# Patient Record
Sex: MALE | Race: WHITE | NOT HISPANIC OR LATINO | Employment: FULL TIME | URBAN - NONMETROPOLITAN AREA
[De-identification: names, ages, dates, MRNs, and addresses within clinical notes are randomized per-mention and may not be internally consistent; named-entity substitution may affect disease eponyms.]

---

## 2019-09-05 ENCOUNTER — OFFICE VISIT (OUTPATIENT)
Dept: FAMILY MEDICINE | Facility: OTHER | Age: 19
End: 2019-09-05
Attending: PHYSICIAN ASSISTANT
Payer: MEDICAID

## 2019-09-05 VITALS
HEART RATE: 80 BPM | SYSTOLIC BLOOD PRESSURE: 110 MMHG | RESPIRATION RATE: 18 BRPM | DIASTOLIC BLOOD PRESSURE: 82 MMHG | TEMPERATURE: 97.4 F | WEIGHT: 128 LBS

## 2019-09-05 DIAGNOSIS — R42 DIZZINESS: Primary | ICD-10-CM

## 2019-09-05 DIAGNOSIS — E16.2 HYPOGLYCEMIA: ICD-10-CM

## 2019-09-05 LAB
ANION GAP SERPL CALCULATED.3IONS-SCNC: 7 MMOL/L (ref 3–14)
BASOPHILS # BLD AUTO: 0.1 10E9/L (ref 0–0.2)
BASOPHILS NFR BLD AUTO: 0.9 %
BUN SERPL-MCNC: 12 MG/DL (ref 7–25)
CALCIUM SERPL-MCNC: 9.6 MG/DL (ref 8.6–10.3)
CHLORIDE SERPL-SCNC: 101 MMOL/L (ref 98–107)
CO2 SERPL-SCNC: 31 MMOL/L (ref 21–31)
CREAT SERPL-MCNC: 1.13 MG/DL (ref 0.7–1.3)
DIFFERENTIAL METHOD BLD: NORMAL
EOSINOPHIL # BLD AUTO: 0.2 10E9/L (ref 0–0.7)
EOSINOPHIL NFR BLD AUTO: 2.3 %
ERYTHROCYTE [DISTWIDTH] IN BLOOD BY AUTOMATED COUNT: 11.8 % (ref 10–15)
GFR SERPL CREATININE-BSD FRML MDRD: NORMAL ML/MIN/{1.73_M2}
GLUCOSE SERPL-MCNC: 96 MG/DL (ref 70–105)
HBA1C MFR BLD: 5 % (ref 4–6)
HCT VFR BLD AUTO: 47.9 % (ref 40–53)
HGB BLD-MCNC: 16.2 G/DL (ref 13.3–17.7)
IMM GRANULOCYTES # BLD: 0 10E9/L (ref 0–0.4)
IMM GRANULOCYTES NFR BLD: 0.4 %
LYMPHOCYTES # BLD AUTO: 2.2 10E9/L (ref 0.8–5.3)
LYMPHOCYTES NFR BLD AUTO: 27.2 %
MCH RBC QN AUTO: 29.2 PG (ref 26.5–33)
MCHC RBC AUTO-ENTMCNC: 33.8 G/DL (ref 31.5–36.5)
MCV RBC AUTO: 86 FL (ref 78–100)
MONOCYTES # BLD AUTO: 0.6 10E9/L (ref 0–1.3)
MONOCYTES NFR BLD AUTO: 7.2 %
NEUTROPHILS # BLD AUTO: 4.9 10E9/L (ref 1.6–8.3)
NEUTROPHILS NFR BLD AUTO: 62 %
PLATELET # BLD AUTO: 295 10E9/L (ref 150–450)
POTASSIUM SERPL-SCNC: 3.7 MMOL/L (ref 3.5–5.1)
RBC # BLD AUTO: 5.55 10E12/L (ref 4.4–5.9)
SODIUM SERPL-SCNC: 139 MMOL/L (ref 134–144)
WBC # BLD AUTO: 7.9 10E9/L (ref 4–11)

## 2019-09-05 PROCEDURE — G0463 HOSPITAL OUTPT CLINIC VISIT: HCPCS

## 2019-09-05 PROCEDURE — 80048 BASIC METABOLIC PNL TOTAL CA: CPT | Mod: ZL | Performed by: PHYSICIAN ASSISTANT

## 2019-09-05 PROCEDURE — 99214 OFFICE O/P EST MOD 30 MIN: CPT | Performed by: PHYSICIAN ASSISTANT

## 2019-09-05 PROCEDURE — 83036 HEMOGLOBIN GLYCOSYLATED A1C: CPT | Mod: ZL | Performed by: PHYSICIAN ASSISTANT

## 2019-09-05 PROCEDURE — 85025 COMPLETE CBC W/AUTO DIFF WBC: CPT | Mod: ZL | Performed by: PHYSICIAN ASSISTANT

## 2019-09-05 PROCEDURE — 36415 COLL VENOUS BLD VENIPUNCTURE: CPT | Mod: ZL | Performed by: PHYSICIAN ASSISTANT

## 2019-09-05 ASSESSMENT — PAIN SCALES - GENERAL: PAINLEVEL: SEVERE PAIN (6)

## 2019-09-05 NOTE — PATIENT INSTRUCTIONS
Patient Education     Hypoglycemia (Low Blood Sugar)     Fast-acting sugar includes a cup of nonfat milk.   Too little sugar (glucose) in your blood is called hypoglycemia or low blood sugar. Low blood sugar usually means anything lower than 70 mg/dL. Talk with your healthcare provider about your target range and what level is too low for you. Diabetes itself doesn t cause low blood sugar. But some of the treatments for diabetes, such as pills or insulin, may raise your risk for it. Low blood sugar may cause you to pass out or have a seizure. So always treat low blood sugar right away, but don't overeat.  Special note: Always carry a source of fast-acting sugar and a snack in case of hypoglycemia.   What you may notice  If you have low blood sugar, you may have one or more of these symptoms:    Shakiness or dizziness    Cold, clammy skin or sweating    Feelings of hunger    Headache    Nervousness    A hard, fast heartbeat    Weakness    Confusion or irritability    Blurred vision    Having nightmares or waking up confused or sweating    Numbness or tingling in the lips or tongue  What you should do  Here are tips to follow if you have hypoglycemia:     First check your blood sugar. If it is too low (out of your target range), eat or drink 15 to 20 grams of fast-acting sugar. This may be 3 to 4 glucose tablets, 4 ounces (half a cup) of fruit juice or regular (nondiet) soda, 8 ounces (1 cup) of fat-free milk, or 1 tablespoon of honey. Don t take more than this, or your blood sugar may go too high.    Wait 15 minutes. Then recheck your blood sugar if you can.    If your blood sugar is still too low, repeat the steps above and check your blood sugar again. If your blood sugar still has not returned to your target range, contact your healthcare provider or seek emergency care.    Once your blood sugar returns to target range, eat a snack or meal.  Preventing low blood sugar  Things you can do include the  following:     If your condition needs a strict treatment plan, eat your meals and snacks at the same times each day. Don t skip meals!    If your treatment plan lets you change when you eat and what you eat, learn how to change the time and dose of your rapid-acting insulin to match this.     Ask your healthcare provider if it is safe for you to drink alcohol. Never drink on an empty stomach.    Take your medicine at the prescribed times.    Always carry a source of fast-acting sugar and a snack when you re away from home.  Other things to do  Additional tips include the following:    Carry a medical ID card, a compact USB drive, or wear a medical alert bracelet or necklace. It should say that you have diabetes. It should also say what to do if you pass out or have a seizure.    Make sure your family, friends, and coworkers know the signs of low blood sugar. Tell them what to do if your blood sugar falls very low and you can t treat yourself.    Keep a glucagon emergency kit handy. Be sure your family, friends, and coworkers know how and when to use it. Check it regularly and replace the glucagon before it expires.    Talk with your health care team about other things you can do to prevent low blood sugar.     If you have unexplained hypoglycemia or hypoglycemia several times, call your healthcare provider.   Date Last Reviewed: 5/1/2016 2000-2018 [employer]. [address] [place] [address], [place] [address]. All rights reserved. This information is not intended as a substitute for professional medical care. Always follow your healthcare professional's instructions.

## 2019-09-05 NOTE — NURSING NOTE
Chief Complaint   Patient presents with     Dizziness     x 2 days     Generalized Body Aches         Medication Reconciliation: complete    Brenna Clifford, LPN

## 2019-09-05 NOTE — LETTER
September 6, 2019      Slim Bueno  [id number] [address] [address]        Dear ,    We are writing to inform you of your test results.    Your white blood cell count, hemoglobin, electrolytes, kidney function, blood sugar and hemoglobin A1c are in the normal range.  Hemoglobin A1c is a diabetic check which is normal.    Resulted Orders   Hemoglobin A1c   Result Value Ref Range    Hemoglobin A1C 5.0 4.0 - 6.0 %   Basic Metabolic Panel   Result Value Ref Range    Sodium 139 134 - 144 mmol/L    Potassium 3.7 3.5 - 5.1 mmol/L    Chloride 101 98 - 107 mmol/L    Carbon Dioxide 31 21 - 31 mmol/L    Anion Gap 7 3 - 14 mmol/L    Glucose 96 70 - 105 mg/dL    Urea Nitrogen 12 7 - 25 mg/dL    Creatinine 1.13 0.70 - 1.30 mg/dL    GFR Estimate Not Calculated >60 mL/min/[1.73_m2]    GFR Estimate If Black Not Calculated >60 mL/min/[1.73_m2]    Calcium 9.6 8.6 - 10.3 mg/dL   CBC and Differential   Result Value Ref Range    WBC 7.9 4.0 - 11.0 10e9/L    RBC Count 5.55 4.4 - 5.9 10e12/L    Hemoglobin 16.2 13.3 - 17.7 g/dL    Hematocrit 47.9 40.0 - 53.0 %    MCV 86 78 - 100 fl    MCH 29.2 26.5 - 33.0 pg    MCHC 33.8 31.5 - 36.5 g/dL    RDW 11.8 10.0 - 15.0 %    Platelet Count 295 150 - 450 10e9/L    Diff Method Automated Method     % Neutrophils 62.0 %    % Lymphocytes 27.2 %    % Monocytes 7.2 %    % Eosinophils 2.3 %    % Basophils 0.9 %    % Immature Granulocytes 0.4 %    Absolute Neutrophil 4.9 1.6 - 8.3 10e9/L    Absolute Lymphocytes 2.2 0.8 - 5.3 10e9/L    Absolute Monocytes 0.6 0.0 - 1.3 10e9/L    Absolute Eosinophils 0.2 0.0 - 0.7 10e9/L    Absolute Basophils 0.1 0.0 - 0.2 10e9/L    Abs Immature Granulocytes 0.0 0 - 0.4 10e9/L       If you have any questions or concerns, please call the clinic at the number listed above.       Sincerely,        Shannan Delarosa PA-C
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
## 2019-09-06 ENCOUNTER — TELEPHONE (OUTPATIENT)
Dept: FAMILY MEDICINE | Facility: OTHER | Age: 19
End: 2019-09-06

## 2019-09-06 NOTE — TELEPHONE ENCOUNTER
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 9/6/2019 8:41 AM    "

## 2019-09-06 NOTE — LETTER
September 6, 2019      Slim Bueno  [id number] [facility] [address]        To Whom It May Concern:    Slim Bueno was seen in our clinic. Please excuse from drill from 9/6-9/8/2019 due to dizziness and weakness. He may return to drill without restrictions on 9/9/2019.      Sincerely,        Shannan Delarosa PA-C

## 2019-09-06 NOTE — TELEPHONE ENCOUNTER
Patient was seen 09.05.19 and was needing a note to excuse him from  duty. Please return call once completed. Thanks!

## 2020-01-27 ENCOUNTER — OFFICE VISIT (OUTPATIENT)
Dept: FAMILY MEDICINE | Facility: OTHER | Age: 20
End: 2020-01-27
Attending: NURSE PRACTITIONER
Payer: COMMERCIAL

## 2020-01-27 ENCOUNTER — NURSE TRIAGE (OUTPATIENT)
Dept: FAMILY MEDICINE | Facility: OTHER | Age: 20
End: 2020-01-27

## 2020-01-27 VITALS
HEART RATE: 101 BPM | DIASTOLIC BLOOD PRESSURE: 72 MMHG | HEIGHT: 67 IN | WEIGHT: 128.13 LBS | SYSTOLIC BLOOD PRESSURE: 110 MMHG | BODY MASS INDEX: 20.11 KG/M2 | OXYGEN SATURATION: 98 % | TEMPERATURE: 101 F | RESPIRATION RATE: 16 BRPM

## 2020-01-27 DIAGNOSIS — R52 GENERALIZED BODY ACHES: ICD-10-CM

## 2020-01-27 DIAGNOSIS — B34.9 VIRAL ILLNESS: ICD-10-CM

## 2020-01-27 DIAGNOSIS — R05.9 COUGH: Primary | ICD-10-CM

## 2020-01-27 LAB
FLUAV+FLUBV RNA SPEC QL NAA+PROBE: NEGATIVE
FLUAV+FLUBV RNA SPEC QL NAA+PROBE: NEGATIVE
RSV RNA SPEC NAA+PROBE: NEGATIVE
SPECIMEN SOURCE: NORMAL

## 2020-01-27 PROCEDURE — 99213 OFFICE O/P EST LOW 20 MIN: CPT | Performed by: NURSE PRACTITIONER

## 2020-01-27 PROCEDURE — G0463 HOSPITAL OUTPT CLINIC VISIT: HCPCS

## 2020-01-27 PROCEDURE — 87631 RESP VIRUS 3-5 TARGETS: CPT | Mod: ZL | Performed by: NURSE PRACTITIONER

## 2020-01-27 ASSESSMENT — ENCOUNTER SYMPTOMS
SHORTNESS OF BREATH: 1
SORE THROAT: 0
FEVER: 1
ACTIVITY CHANGE: 0
CHILLS: 1
VOMITING: 0
FATIGUE: 1
SINUS PAIN: 0
COUGH: 1
APPETITE CHANGE: 0
NAUSEA: 0
HEADACHES: 1
DIARRHEA: 0
ABDOMINAL PAIN: 0
SINUS PRESSURE: 0

## 2020-01-27 ASSESSMENT — MIFFLIN-ST. JEOR: SCORE: 1544.8

## 2020-01-27 ASSESSMENT — PAIN SCALES - GENERAL: PAINLEVEL: EXTREME PAIN (8)

## 2020-01-27 NOTE — NURSING NOTE
Patient presents to clinic experiencing fever 101, cough, shortness of breath, cough, stomach cramps and body aches since this morning.    Medication Reconciliation: complete    Yolanda Ocampo LPN

## 2020-01-27 NOTE — PROGRESS NOTES
"  SUBJECTIVE:   Slim Bueno is a 19 year old male who presents to clinic today for the following health issues:    HPI  Patient presents for evaluation of fever, body aches, cough and dizziness that started this morning. He was around family this weekend and reports that everyone got ill. He went to work today and his boss sent him home. He took tylenol x 1 for a headache.   He did not get his flu shot this year.   No significant medical history.     There are no active problems to display for this patient.    History reviewed. No pertinent past medical history.   History reviewed. No pertinent surgical history.    Review of Systems   Constitutional: Positive for chills, fatigue and fever. Negative for activity change and appetite change.   HENT: Negative for congestion, ear discharge, ear pain, postnasal drip, sinus pressure, sinus pain and sore throat.    Respiratory: Positive for cough and shortness of breath.    Cardiovascular: Negative for chest pain.   Gastrointestinal: Negative for abdominal pain, diarrhea, nausea and vomiting.   Neurological: Positive for headaches.        OBJECTIVE:     /72 (BP Location: Right arm, Patient Position: Sitting, Cuff Size: Adult Regular)   Pulse 101   Temp 101  F (38.3  C) (Tympanic)   Resp 16   Ht 1.702 m (5' 7\")   Wt 58.1 kg (128 lb 2 oz)   SpO2 98%   BMI 20.07 kg/m    Body mass index is 20.07 kg/m .  Physical Exam  Constitutional:       Appearance: Normal appearance.   HENT:      Head: Normocephalic.      Right Ear: Tympanic membrane normal.      Left Ear: Tympanic membrane normal.      Mouth/Throat:      Mouth: Mucous membranes are moist.   Cardiovascular:      Rate and Rhythm: Regular rhythm. Tachycardia present.   Pulmonary:      Effort: Pulmonary effort is normal.      Breath sounds: Normal breath sounds.   Lymphadenopathy:      Cervical: No cervical adenopathy.   Neurological:      Mental Status: He is alert.         Diagnostic Test Results:  Results " for orders placed or performed in visit on 01/27/20 (from the past 24 hour(s))   Influenza A and B and RSV PCR   Result Value Ref Range    Specimen Description Nasopharyngeal     Influenza A PCR Negative NEG^Negative    Influenza B PCR Negative NEG^Negative    Resp Syncytial Virus Negative NEG^Negative       ASSESSMENT/PLAN:   1. Cough  - Influenza A and B and RSV PCR    2. Generalized body aches  - Influenza A and B and RSV PCR    3. Viral illness  Influenza swab negative. However, I do think this is likely viral illness with sudden onset and recent sick contacts. We discussed hydration, rest and tylenol for fevers. Suggested follow-up for worsening headache, decreased LOC or worsening of symptoms.     Janet Rojas [facility]

## 2020-01-27 NOTE — LETTER
[facility]  [address]  [place] [address]  [phone]      January 27, 2020      RE: Slim Bueno  [address]   [facility] [address]       To whom it may concern:    Slim Bueno was seen in our clinic today.  He was diagnosed with influenza. He can return to work once he is fever free for 24 hours and is not having a cough.     Sincerely,      Janet DAVE

## 2020-01-27 NOTE — TELEPHONE ENCOUNTER
S-(situation): Breathing issues / Dizziness/ Fatigued    B-(background): Reports being around people that have been sick.    A-(assessment): Started this morning. Patient is wanting to be seen in an office visit today. Denies history of asthma. He is able to speak complete sentences on the phone with out extra breaths.    R-(recommendations): transferred to scheduling to make an appointment. Appointment scheduled today with ZAKI Rojas. Francisca Ferrara RN  ....................  1/27/2020   3:06 PM        Additional Information    Negative: Breathing stopped and hasn't returned    Negative: Choking on something    Negative: SEVERE difficulty breathing (e.g., struggling for each breath, speaks in single words, pulse > 120)    Negative: Bluish (or gray) lips or face    Negative: Difficult to awaken or acting confused (e.g., disoriented, slurred speech)    Negative: Passed out (i.e., fainted, collapsed and was not responding)    Negative: Stridor    Negative: Slow, shallow and weak breathing    Negative: Sounds like a life-threatening emergency to the triager    Negative: Wheezing started suddenly after medicine, an allergic food, or bee sting    Patient wants to be seen    Protocols used: BREATHING DIFFICULTY-A-OH

## 2020-02-20 ENCOUNTER — OFFICE VISIT (OUTPATIENT)
Dept: FAMILY MEDICINE | Facility: OTHER | Age: 20
End: 2020-02-20
Attending: NURSE PRACTITIONER
Payer: COMMERCIAL

## 2020-02-20 VITALS
BODY MASS INDEX: 19.96 KG/M2 | DIASTOLIC BLOOD PRESSURE: 60 MMHG | SYSTOLIC BLOOD PRESSURE: 102 MMHG | HEIGHT: 67 IN | TEMPERATURE: 97.1 F | HEART RATE: 71 BPM | RESPIRATION RATE: 16 BRPM | OXYGEN SATURATION: 99 % | WEIGHT: 127.13 LBS

## 2020-02-20 DIAGNOSIS — G44.209 TENSION HEADACHE: ICD-10-CM

## 2020-02-20 DIAGNOSIS — R68.83 CHILLS: Primary | ICD-10-CM

## 2020-02-20 LAB
ALBUMIN SERPL-MCNC: 4.9 G/DL (ref 3.5–5.7)
ALP SERPL-CCNC: 66 U/L (ref 34–104)
ALT SERPL W P-5'-P-CCNC: 17 U/L (ref 7–52)
ANION GAP SERPL CALCULATED.3IONS-SCNC: 7 MMOL/L (ref 3–14)
AST SERPL W P-5'-P-CCNC: 17 U/L (ref 13–39)
BASOPHILS # BLD AUTO: 0.1 10E9/L (ref 0–0.2)
BASOPHILS NFR BLD AUTO: 0.8 %
BILIRUB SERPL-MCNC: 1.1 MG/DL (ref 0.3–1)
BUN SERPL-MCNC: 10 MG/DL (ref 7–25)
CALCIUM SERPL-MCNC: 9.4 MG/DL (ref 8.6–10.3)
CHLORIDE SERPL-SCNC: 101 MMOL/L (ref 98–107)
CO2 SERPL-SCNC: 31 MMOL/L (ref 21–31)
CREAT SERPL-MCNC: 1.1 MG/DL (ref 0.7–1.3)
DIFFERENTIAL METHOD BLD: NORMAL
EOSINOPHIL # BLD AUTO: 0.1 10E9/L (ref 0–0.7)
EOSINOPHIL NFR BLD AUTO: 0.8 %
ERYTHROCYTE [DISTWIDTH] IN BLOOD BY AUTOMATED COUNT: 11.8 % (ref 10–15)
GFR SERPL CREATININE-BSD FRML MDRD: 85 ML/MIN/{1.73_M2}
GLUCOSE SERPL-MCNC: 92 MG/DL (ref 70–105)
HCT VFR BLD AUTO: 48.8 % (ref 40–53)
HGB BLD-MCNC: 16.2 G/DL (ref 13.3–17.7)
IMM GRANULOCYTES # BLD: 0 10E9/L (ref 0–0.4)
IMM GRANULOCYTES NFR BLD: 0.2 %
LYMPHOCYTES # BLD AUTO: 2.1 10E9/L (ref 0.8–5.3)
LYMPHOCYTES NFR BLD AUTO: 34.4 %
MCH RBC QN AUTO: 28.2 PG (ref 26.5–33)
MCHC RBC AUTO-ENTMCNC: 33.2 G/DL (ref 31.5–36.5)
MCV RBC AUTO: 85 FL (ref 78–100)
MONOCYTES # BLD AUTO: 0.5 10E9/L (ref 0–1.3)
MONOCYTES NFR BLD AUTO: 7.2 %
NEUTROPHILS # BLD AUTO: 3.5 10E9/L (ref 1.6–8.3)
NEUTROPHILS NFR BLD AUTO: 56.6 %
PLATELET # BLD AUTO: 309 10E9/L (ref 150–450)
POTASSIUM SERPL-SCNC: 3.9 MMOL/L (ref 3.5–5.1)
PROT SERPL-MCNC: 7.9 G/DL (ref 6.4–8.9)
RBC # BLD AUTO: 5.75 10E12/L (ref 4.4–5.9)
SODIUM SERPL-SCNC: 139 MMOL/L (ref 134–144)
TSH SERPL DL<=0.05 MIU/L-ACNC: 2 IU/ML (ref 0.34–5.6)
WBC # BLD AUTO: 6.2 10E9/L (ref 4–11)

## 2020-02-20 PROCEDURE — G0463 HOSPITAL OUTPT CLINIC VISIT: HCPCS

## 2020-02-20 PROCEDURE — 99213 OFFICE O/P EST LOW 20 MIN: CPT | Performed by: NURSE PRACTITIONER

## 2020-02-20 PROCEDURE — 85025 COMPLETE CBC W/AUTO DIFF WBC: CPT | Mod: ZL | Performed by: NURSE PRACTITIONER

## 2020-02-20 PROCEDURE — 36415 COLL VENOUS BLD VENIPUNCTURE: CPT | Mod: ZL | Performed by: NURSE PRACTITIONER

## 2020-02-20 PROCEDURE — 86140 C-REACTIVE PROTEIN: CPT | Mod: ZL | Performed by: NURSE PRACTITIONER

## 2020-02-20 PROCEDURE — 80053 COMPREHEN METABOLIC PANEL: CPT | Mod: ZL | Performed by: NURSE PRACTITIONER

## 2020-02-20 PROCEDURE — 84145 PROCALCITONIN (PCT): CPT | Mod: ZL | Performed by: NURSE PRACTITIONER

## 2020-02-20 PROCEDURE — 84443 ASSAY THYROID STIM HORMONE: CPT | Mod: ZL | Performed by: NURSE PRACTITIONER

## 2020-02-20 ASSESSMENT — ENCOUNTER SYMPTOMS
GASTROINTESTINAL NEGATIVE: 1
CHILLS: 0
APPETITE CHANGE: 0
DIZZINESS: 0
FEVER: 1
CHEST TIGHTNESS: 0
UNEXPECTED WEIGHT CHANGE: 0
HEADACHES: 1
ACTIVITY CHANGE: 0

## 2020-02-20 ASSESSMENT — MIFFLIN-ST. JEOR: SCORE: 1540.27

## 2020-02-20 ASSESSMENT — PAIN SCALES - GENERAL: PAINLEVEL: MODERATE PAIN (4)

## 2020-02-20 NOTE — NURSING NOTE
Patient presents to clinic for follow up with ongoing fevers and headaches.    Medication Reconciliation: complete    Yolanda Ocampo LPN

## 2020-02-20 NOTE — PROGRESS NOTES
"  SUBJECTIVE:   Slim Bueno is a 19 year old male who presents to clinic today for the following health issues:    HPI  Patient presents for evaluation of intermittent fevers and headaches. No night sweats. No weight loss. No cough. Headaches intermittent. Reports headache is across his eyes and to the back of his head. It is not present at today's visit. No problems with headaches in the past. This is intermittent. Tylenol is usually not effective. Notes fever yesterday of 100.7 F but will usually have low grade. Fevers are also intermittent and are not correlated with his headaches. He has been checking his temperature three times a day. He is afebrile at this visit.    Denies abdominal pain. No new rashes or lymphadenopathy. No diarrhea. No urinary symptoms.       There are no active problems to display for this patient.    History reviewed. No pertinent past medical history.   History reviewed. No pertinent surgical history.    Review of Systems   Constitutional: Positive for fever. Negative for activity change, appetite change, chills and unexpected weight change.   HENT: Negative.    Respiratory: Negative for chest tightness.    Gastrointestinal: Negative.    Skin: Negative for rash.   Neurological: Positive for headaches. Negative for dizziness.        OBJECTIVE:     /60 (BP Location: Right arm, Patient Position: Sitting, Cuff Size: Adult Regular)   Pulse 71   Temp 97.1  F (36.2  C) (Tympanic)   Resp 16   Ht 1.702 m (5' 7\")   Wt 57.7 kg (127 lb 2 oz)   SpO2 99%   BMI 19.91 kg/m    Body mass index is 19.91 kg/m .  Physical Exam  Constitutional:       Appearance: Normal appearance. He is normal weight.   HENT:      Head: Normocephalic.      Right Ear: Tympanic membrane normal.      Left Ear: Tympanic membrane normal.      Mouth/Throat:      Mouth: Mucous membranes are moist.   Cardiovascular:      Rate and Rhythm: Normal rate and regular rhythm.   Pulmonary:      Effort: Pulmonary effort is " normal.      Breath sounds: Normal breath sounds.   Lymphadenopathy:      Cervical: No cervical adenopathy.   Skin:     General: Skin is warm and dry.   Neurological:      Mental Status: He is alert.   Psychiatric:         Mood and Affect: Mood normal.         Diagnostic Test Results:  Results for orders placed or performed in visit on 02/20/20 (from the past 24 hour(s))   Comprehensive Metabolic Panel   Result Value Ref Range    Sodium 139 134 - 144 mmol/L    Potassium 3.9 3.5 - 5.1 mmol/L    Chloride 101 98 - 107 mmol/L    Carbon Dioxide 31 21 - 31 mmol/L    Anion Gap 7 3 - 14 mmol/L    Glucose 92 70 - 105 mg/dL    Urea Nitrogen 10 7 - 25 mg/dL    Creatinine 1.10 0.70 - 1.30 mg/dL    GFR Estimate 85 >60 mL/min/[1.73_m2]    GFR Estimate If Black >90 >60 mL/min/[1.73_m2]    Calcium 9.4 8.6 - 10.3 mg/dL    Bilirubin Total 1.1 (H) 0.3 - 1.0 mg/dL    Albumin 4.9 3.5 - 5.7 g/dL    Protein Total 7.9 6.4 - 8.9 g/dL    Alkaline Phosphatase 66 34 - 104 U/L    ALT 17 7 - 52 U/L    AST 17 13 - 39 U/L   TSH   Result Value Ref Range    Thyrotropin 2.00 0.34 - 5.60 IU/mL   CBC and Differential   Result Value Ref Range    WBC 6.2 4.0 - 11.0 10e9/L    RBC Count 5.75 4.4 - 5.9 10e12/L    Hemoglobin 16.2 13.3 - 17.7 g/dL    Hematocrit 48.8 40.0 - 53.0 %    MCV 85 78 - 100 fl    MCH 28.2 26.5 - 33.0 pg    MCHC 33.2 31.5 - 36.5 g/dL    RDW 11.8 10.0 - 15.0 %    Platelet Count 309 150 - 450 10e9/L    Diff Method Automated Method     % Neutrophils 56.6 %    % Lymphocytes 34.4 %    % Monocytes 7.2 %    % Eosinophils 0.8 %    % Basophils 0.8 %    % Immature Granulocytes 0.2 %    Absolute Neutrophil 3.5 1.6 - 8.3 10e9/L    Absolute Lymphocytes 2.1 0.8 - 5.3 10e9/L    Absolute Monocytes 0.5 0.0 - 1.3 10e9/L    Absolute Eosinophils 0.1 0.0 - 0.7 10e9/L    Absolute Basophils 0.1 0.0 - 0.2 10e9/L    Abs Immature Granulocytes 0.0 0 - 0.4 10e9/L       ASSESSMENT/PLAN:   1. Chills  Labs from today are normal, except pro calcitonin which is  slightly elevated. CRP negative. CBC normal. Considerations include CMV/EBV infection with continued intermittent fevers and headache. We should also consider tick borne illnesses (Lyme or anaplasmosis), though this is less likely due to current season. I would like to check tick borne diseases in the next few days as well as repeat pro calcitonin. Results may give better guidance on cause of patient's symptoms. If patient continues to have fevers and lab work is negative could consider infectious disease consult.    - CBC and Differential; Future  - TSH; Future  - Comprehensive Metabolic Panel; Future  - Comprehensive Metabolic Panel  - TSH  - CBC and Differential  - Procalcitonin  - CRP inflammation    2. Tension headache  - CBC and Differential; Future  - TSH; Future  - Comprehensive Metabolic Panel; Future  - Comprehensive Metabolic Panel  - TSH  - CBC and Differential  - Procalcitonin  - CRP inflammation    Janet CHEEMAP-[facility]

## 2020-02-21 LAB
CRP SERPL-MCNC: 0.4 MG/L
PROCALCITONIN SERPL-MCNC: 1.07 NG/ML

## 2020-02-24 DIAGNOSIS — R68.83 CHILLS: ICD-10-CM

## 2020-02-24 LAB — PROCALCITONIN SERPL-MCNC: <0.5 NG/ML

## 2020-02-24 PROCEDURE — 84145 PROCALCITONIN (PCT): CPT | Mod: ZL | Performed by: NURSE PRACTITIONER

## 2020-02-24 PROCEDURE — 87798 DETECT AGENT NOS DNA AMP: CPT | Mod: ZL | Performed by: NURSE PRACTITIONER

## 2020-02-24 PROCEDURE — 36415 COLL VENOUS BLD VENIPUNCTURE: CPT | Mod: ZL | Performed by: NURSE PRACTITIONER

## 2020-02-24 PROCEDURE — 86618 LYME DISEASE ANTIBODY: CPT | Mod: ZL | Performed by: NURSE PRACTITIONER

## 2020-02-26 LAB — B BURGDOR IGG+IGM SER QL: 0.08 (ref 0–0.89)

## 2020-03-01 LAB
A PHAGOCYTOPH DNA BLD QL NAA+PROBE: NOT DETECTED
E CHAFFEENSIS DNA BLD QL NAA+PROBE: NOT DETECTED
E EWINGII DNA SPEC QL NAA+PROBE: NOT DETECTED
EHRLICHIA DNA SPEC QL NAA+PROBE: NOT DETECTED

## 2020-03-03 ENCOUNTER — TELEPHONE (OUTPATIENT)
Dept: FAMILY MEDICINE | Facility: OTHER | Age: 20
End: 2020-03-03

## 2020-03-04 ENCOUNTER — OFFICE VISIT (OUTPATIENT)
Dept: FAMILY MEDICINE | Facility: OTHER | Age: 20
End: 2020-03-04
Attending: NURSE PRACTITIONER
Payer: COMMERCIAL

## 2020-03-04 ENCOUNTER — TELEPHONE (OUTPATIENT)
Dept: FAMILY MEDICINE | Facility: OTHER | Age: 20
End: 2020-03-04

## 2020-03-04 VITALS
BODY MASS INDEX: 19.95 KG/M2 | SYSTOLIC BLOOD PRESSURE: 108 MMHG | TEMPERATURE: 96.9 F | HEIGHT: 66 IN | OXYGEN SATURATION: 99 % | DIASTOLIC BLOOD PRESSURE: 60 MMHG | HEART RATE: 80 BPM | RESPIRATION RATE: 18 BRPM | WEIGHT: 124.13 LBS

## 2020-03-04 DIAGNOSIS — R50.9 INTERMITTENT FEVER: ICD-10-CM

## 2020-03-04 DIAGNOSIS — M54.2 CHRONIC NECK PAIN: ICD-10-CM

## 2020-03-04 DIAGNOSIS — Z00.00 ROUTINE GENERAL MEDICAL EXAMINATION AT A HEALTH CARE FACILITY: Primary | ICD-10-CM

## 2020-03-04 DIAGNOSIS — M54.50 CHRONIC LOW BACK PAIN WITHOUT SCIATICA, UNSPECIFIED BACK PAIN LATERALITY: ICD-10-CM

## 2020-03-04 DIAGNOSIS — G89.29 CHRONIC LOW BACK PAIN WITHOUT SCIATICA, UNSPECIFIED BACK PAIN LATERALITY: ICD-10-CM

## 2020-03-04 DIAGNOSIS — G89.29 CHRONIC NECK PAIN: ICD-10-CM

## 2020-03-04 PROCEDURE — 36415 COLL VENOUS BLD VENIPUNCTURE: CPT | Mod: ZL | Performed by: NURSE PRACTITIONER

## 2020-03-04 PROCEDURE — G0463 HOSPITAL OUTPT CLINIC VISIT: HCPCS

## 2020-03-04 PROCEDURE — 86665 EPSTEIN-BARR CAPSID VCA: CPT | Mod: ZL | Performed by: NURSE PRACTITIONER

## 2020-03-04 PROCEDURE — 86645 CMV ANTIBODY IGM: CPT | Mod: ZL | Performed by: NURSE PRACTITIONER

## 2020-03-04 PROCEDURE — 87389 HIV-1 AG W/HIV-1&-2 AB AG IA: CPT | Mod: ZL | Performed by: NURSE PRACTITIONER

## 2020-03-04 PROCEDURE — 99395 PREV VISIT EST AGE 18-39: CPT | Performed by: NURSE PRACTITIONER

## 2020-03-04 ASSESSMENT — ANXIETY QUESTIONNAIRES
GAD7 TOTAL SCORE: 2
6. BECOMING EASILY ANNOYED OR IRRITABLE: SEVERAL DAYS
IF YOU CHECKED OFF ANY PROBLEMS ON THIS QUESTIONNAIRE, HOW DIFFICULT HAVE THESE PROBLEMS MADE IT FOR YOU TO DO YOUR WORK, TAKE CARE OF THINGS AT HOME, OR GET ALONG WITH OTHER PEOPLE: NOT DIFFICULT AT ALL
7. FEELING AFRAID AS IF SOMETHING AWFUL MIGHT HAPPEN: NOT AT ALL
2. NOT BEING ABLE TO STOP OR CONTROL WORRYING: NOT AT ALL
5. BEING SO RESTLESS THAT IT IS HARD TO SIT STILL: NOT AT ALL
3. WORRYING TOO MUCH ABOUT DIFFERENT THINGS: NOT AT ALL
1. FEELING NERVOUS, ANXIOUS, OR ON EDGE: SEVERAL DAYS

## 2020-03-04 ASSESSMENT — PAIN SCALES - GENERAL: PAINLEVEL: MODERATE PAIN (4)

## 2020-03-04 ASSESSMENT — PATIENT HEALTH QUESTIONNAIRE - PHQ9
SUM OF ALL RESPONSES TO PHQ QUESTIONS 1-9: 2
5. POOR APPETITE OR OVEREATING: NOT AT ALL

## 2020-03-04 ASSESSMENT — MIFFLIN-ST. JEOR: SCORE: 1510.78
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
## 2020-03-04 NOTE — NURSING NOTE
Patient presents to clinic for physical and annual exam.  He states he has been running low grade fevers in the evening for past 38 days.    Medication Reconciliation: complete    Yolanda Ocampo LPN

## 2020-03-04 NOTE — TELEPHONE ENCOUNTER
Notified patient of response per Janet Rojas NP and he verbally understood.  He will be called when referral goes thru scheduling for appointment with [facility] hugo.    Yolanda Ocampo LPN............3/4/2020 4:13 PM

## 2020-03-04 NOTE — TELEPHONE ENCOUNTER
Left message for patient to return call to clinic.  Jante Rojas NP does not feel comfortable filling out the paperwork brought in during office visit.  She has put in referral for patient to be seen Occupational Medicine.  He will be call for assistance in scheduling that appointment.    Yolanda Ocampo LPN............3/4/2020 3:37 PM

## 2020-03-05 ENCOUNTER — TELEPHONE (OUTPATIENT)
Dept: FAMILY MEDICINE | Facility: OTHER | Age: 20
End: 2020-03-05

## 2020-03-05 DIAGNOSIS — Z01.89 NEED FOR PHYSICAL THERAPY ASSESSMENT: Primary | ICD-10-CM

## 2020-03-05 LAB
CMV IGM SERPL QL IA: <0.2 AI (ref 0–0.8)
EBV VCA IGM SER QL IA: <0.2 AI (ref 0–0.8)
HIV 1+2 AB+HIV1 P24 AG SERPL QL IA: NONREACTIVE

## 2020-03-05 ASSESSMENT — ANXIETY QUESTIONNAIRES: GAD7 TOTAL SCORE: 2

## 2020-03-05 NOTE — TELEPHONE ENCOUNTER
Patient has appointment with Dr. Rudolph Silver for occupational exam.  He had paperwork to be completed for  physical evaluation.      Dr. Silver's nurse states patient should be seen by Royce Bishop at [facility].  This will be for a Functional Capacity exam.  PT will be able to fill out paperwork based on results of exam.  Please place referral for physical therapy and patient will be called to reschedule.    Yolanda Ocampo LPN............3/5/2020 4:35 PM

## 2020-06-22 ENCOUNTER — OFFICE VISIT (OUTPATIENT)
Dept: FAMILY MEDICINE | Facility: OTHER | Age: 20
End: 2020-06-22
Attending: PHYSICIAN ASSISTANT
Payer: COMMERCIAL

## 2020-06-22 VITALS
HEART RATE: 78 BPM | BODY MASS INDEX: 20.12 KG/M2 | TEMPERATURE: 99 F | DIASTOLIC BLOOD PRESSURE: 62 MMHG | WEIGHT: 125.2 LBS | SYSTOLIC BLOOD PRESSURE: 98 MMHG | HEIGHT: 66 IN | RESPIRATION RATE: 12 BRPM

## 2020-06-22 DIAGNOSIS — R42 DIZZINESS: Primary | ICD-10-CM

## 2020-06-22 LAB
ANION GAP SERPL CALCULATED.3IONS-SCNC: 7 MMOL/L (ref 3–14)
BUN SERPL-MCNC: 10 MG/DL (ref 7–25)
CALCIUM SERPL-MCNC: 9.5 MG/DL (ref 8.6–10.3)
CHLORIDE SERPL-SCNC: 103 MMOL/L (ref 98–107)
CO2 SERPL-SCNC: 30 MMOL/L (ref 21–31)
CREAT SERPL-MCNC: 0.94 MG/DL (ref 0.7–1.3)
ERYTHROCYTE [DISTWIDTH] IN BLOOD BY AUTOMATED COUNT: 11.9 % (ref 10–15)
GFR SERPL CREATININE-BSD FRML MDRD: >90 ML/MIN/{1.73_M2}
GLUCOSE SERPL-MCNC: 89 MG/DL (ref 70–105)
HBA1C MFR BLD: 4.6 % (ref 4–6)
HCT VFR BLD AUTO: 47.5 % (ref 40–53)
HGB BLD-MCNC: 16.2 G/DL (ref 13.3–17.7)
MCH RBC QN AUTO: 29.1 PG (ref 26.5–33)
MCHC RBC AUTO-ENTMCNC: 34.1 G/DL (ref 31.5–36.5)
MCV RBC AUTO: 85 FL (ref 78–100)
PLATELET # BLD AUTO: 319 10E9/L (ref 150–450)
POTASSIUM SERPL-SCNC: 4 MMOL/L (ref 3.5–5.1)
RBC # BLD AUTO: 5.57 10E12/L (ref 4.4–5.9)
SODIUM SERPL-SCNC: 140 MMOL/L (ref 134–144)
WBC # BLD AUTO: 6.4 10E9/L (ref 4–11)

## 2020-06-22 PROCEDURE — 80048 BASIC METABOLIC PNL TOTAL CA: CPT | Mod: ZL | Performed by: PHYSICIAN ASSISTANT

## 2020-06-22 PROCEDURE — G0463 HOSPITAL OUTPT CLINIC VISIT: HCPCS

## 2020-06-22 PROCEDURE — 83036 HEMOGLOBIN GLYCOSYLATED A1C: CPT | Mod: ZL | Performed by: PHYSICIAN ASSISTANT

## 2020-06-22 PROCEDURE — 99213 OFFICE O/P EST LOW 20 MIN: CPT | Performed by: PHYSICIAN ASSISTANT

## 2020-06-22 PROCEDURE — 85027 COMPLETE CBC AUTOMATED: CPT | Mod: ZL | Performed by: PHYSICIAN ASSISTANT

## 2020-06-22 PROCEDURE — 36415 COLL VENOUS BLD VENIPUNCTURE: CPT | Mod: ZL | Performed by: PHYSICIAN ASSISTANT

## 2020-06-22 ASSESSMENT — MIFFLIN-ST. JEOR: SCORE: 1515.65

## 2020-06-22 ASSESSMENT — PAIN SCALES - GENERAL: PAINLEVEL: MODERATE PAIN (4)

## 2020-06-22 NOTE — NURSING NOTE
Patient presents to clinic with concerns about dizziness and confusion he experienced yesterday while at work that lasted about 30-60 minutes. He states that he didn't have a break and didn't drink any water   Adeline Mahajan LPN ....................  6/22/2020   12:45 PM
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
## 2020-09-24 ENCOUNTER — OFFICE VISIT (OUTPATIENT)
Dept: FAMILY MEDICINE | Facility: OTHER | Age: 20
End: 2020-09-24
Attending: FAMILY MEDICINE
Payer: COMMERCIAL

## 2020-09-24 VITALS
WEIGHT: 125.8 LBS | HEART RATE: 93 BPM | OXYGEN SATURATION: 99 % | BODY MASS INDEX: 20.3 KG/M2 | RESPIRATION RATE: 16 BRPM | SYSTOLIC BLOOD PRESSURE: 108 MMHG | DIASTOLIC BLOOD PRESSURE: 82 MMHG | TEMPERATURE: 97.8 F

## 2020-09-24 DIAGNOSIS — J02.9 SORE THROAT: Primary | ICD-10-CM

## 2020-09-24 LAB
SPECIMEN SOURCE: NORMAL
STREP GROUP A PCR: NOT DETECTED

## 2020-09-24 PROCEDURE — 99213 OFFICE O/P EST LOW 20 MIN: CPT | Performed by: FAMILY MEDICINE

## 2020-09-24 PROCEDURE — 87651 STREP A DNA AMP PROBE: CPT | Mod: ZL | Performed by: FAMILY MEDICINE

## 2020-09-24 PROCEDURE — G0463 HOSPITAL OUTPT CLINIC VISIT: HCPCS

## 2020-09-24 ASSESSMENT — PAIN SCALES - GENERAL: PAINLEVEL: NO PAIN (1)

## 2020-09-24 ASSESSMENT — ENCOUNTER SYMPTOMS
CHILLS: 0
RHINORRHEA: 0
FEVER: 0

## 2020-09-24 NOTE — NURSING NOTE
"Chief Complaint   Patient presents with     Pharyngitis     Sore Throat        Initial /82 (BP Location: Right arm, Patient Position: Chair, Cuff Size: Adult Regular)   Pulse 93   Temp 97.8  F (36.6  C) (Tympanic)   Resp 16   Wt 57.1 kg (125 lb 12.8 oz)   SpO2 99%   BMI 20.30 kg/m   Estimated body mass index is 20.3 kg/m  as calculated from the following:    Height as of 6/22/20: 1.676 m (5' 6\").    Weight as of this encounter: 57.1 kg (125 lb 12.8 oz).  Medication Reconciliation: complete      Jayde Kinney LPN on 9/24/2020 at 11:48 AM   "
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
## 2020-09-25 ENCOUNTER — TELEPHONE (OUTPATIENT)
Dept: FAMILY MEDICINE | Facility: OTHER | Age: 20
End: 2020-09-25

## 2020-09-25 NOTE — TELEPHONE ENCOUNTER
Reason for call: Request for results.    Name of test or procedure: Infection in throat     Date of test or procedure: 09-    Location of test or procedure: [facility]    Preferred method for responding to this message: Telephone Call    Phone number patient can be reached at: Home number on file [phone] (home)    If we can't reach you directly, may we leave a detailed response at the number you provided?Yes

## 2020-09-25 NOTE — TELEPHONE ENCOUNTER
Let pt know the strep test was negative. It is a DNA based test, very sensitive, so throat culture is not needed.  Cornell Muniz MD on 9/25/2020 at 12:25 PM

## 2020-09-25 NOTE — TELEPHONE ENCOUNTER
Dr Lomeli is not her until 10/7/20. Will route to another provider.  Norma J. Gosselin, LPN .......  9/25/2020  11:55 AM

## 2020-09-28 NOTE — TELEPHONE ENCOUNTER
Notified patient of providers note.  Adeline Mahajan, LPNLPN....................  9/28/2020   1:44 PM

## 2020-10-13 ENCOUNTER — OFFICE VISIT (OUTPATIENT)
Dept: OTOLARYNGOLOGY | Facility: OTHER | Age: 20
End: 2020-10-13
Attending: OTOLARYNGOLOGY
Payer: COMMERCIAL

## 2020-10-13 DIAGNOSIS — K13.79 UVULAR EDEMA: Primary | ICD-10-CM

## 2020-10-13 PROCEDURE — G0463 HOSPITAL OUTPT CLINIC VISIT: HCPCS

## 2020-10-15 NOTE — PROGRESS NOTES
document embedded image  Patient Name: Slim Bueno    Address: [address]     YOB: 2000    [place] [id number]    MR Number: [id number]    Phone: [phone]  PCP: Neli Lomeli DO            Appointment Date: 10/13/20   Visit Provider: Manoj George MD    cc: Neli Lomeli DO; ~    ENT Progress Note    Visit Reasons: Sore Throat    HPI  History of Present Illness  Chief complaint:  Acute uvular edema    History  The patient is a 19-year-old who presented to his primary in late September with throat pain and acute uvular swelling.  He states he had a foreign body sensation in his pharynx.  His workup was negative.  He is here today for follow-up.  He has no lingering symptoms at this time.    Exam  Oral cavity oropharynx-no mucosal lesions or abnormalities.  His uvula is normal today.  Neck-no masses or adenopathy  Nasal-no obstruction or purulence  Head and neck integument-Clear  General-the patient appears well and in no distress  Neuro-there are no focal cranial nerve deficits    Home Medications     - Last Reconciled 10/14/20 by Gisselle Fry CMA    aspirin-caffeine 500 mg-32.5 mg tablet  (Back and Body Pain Reliever)  tabs PO    Allergies    No Known Allergies Allergy (Unverified 10/14/20 09:32)    PFSH  PFSH:     Family History (Updated 10/14/20 @ 09:33 by Gisselle Fry CMA)  Other  Asthma  Cancer  Coronary artery disease  Hypertension       Social History (Updated 10/14/20 @ 09:33 by Gisselle Fry CMA)  Smoking Status:  Former smoker  second hand exposure:  Yes       A&P  Assessment & Plan  (1) Uvular edema:        Status: Acute        Code(s):  K13.79 - Other lesions of oral mucosa  Additional Plan Details  Plan Details: The patient's symptoms have completely resolved.  There is no visible persisting edema on exam.  The patient was reassured that further workup or intervention is not necessary.      Manoj George MD    10/13/20 0806    <Electronically signed by  Manoj George MD> 10/14/20 1101

## 2020-12-03 ENCOUNTER — OFFICE VISIT (OUTPATIENT)
Dept: FAMILY MEDICINE | Facility: OTHER | Age: 20
End: 2020-12-03
Attending: FAMILY MEDICINE
Payer: COMMERCIAL

## 2020-12-03 ENCOUNTER — HOSPITAL ENCOUNTER (OUTPATIENT)
Dept: GENERAL RADIOLOGY | Facility: OTHER | Age: 20
End: 2020-12-03
Attending: FAMILY MEDICINE
Payer: COMMERCIAL

## 2020-12-03 VITALS
BODY MASS INDEX: 20.57 KG/M2 | DIASTOLIC BLOOD PRESSURE: 70 MMHG | HEART RATE: 78 BPM | SYSTOLIC BLOOD PRESSURE: 104 MMHG | OXYGEN SATURATION: 100 % | TEMPERATURE: 96.8 F | HEIGHT: 66 IN | WEIGHT: 128 LBS | RESPIRATION RATE: 16 BRPM

## 2020-12-03 DIAGNOSIS — M25.562 PAIN IN BOTH KNEES, UNSPECIFIED CHRONICITY: Primary | ICD-10-CM

## 2020-12-03 DIAGNOSIS — M25.562 PAIN IN BOTH KNEES, UNSPECIFIED CHRONICITY: ICD-10-CM

## 2020-12-03 DIAGNOSIS — M25.561 PAIN IN BOTH KNEES, UNSPECIFIED CHRONICITY: ICD-10-CM

## 2020-12-03 DIAGNOSIS — M25.561 PAIN IN BOTH KNEES, UNSPECIFIED CHRONICITY: Primary | ICD-10-CM

## 2020-12-03 PROCEDURE — 73564 X-RAY EXAM KNEE 4 OR MORE: CPT | Mod: 50

## 2020-12-03 PROCEDURE — G0463 HOSPITAL OUTPT CLINIC VISIT: HCPCS | Mod: 25

## 2020-12-03 PROCEDURE — 99213 OFFICE O/P EST LOW 20 MIN: CPT | Performed by: FAMILY MEDICINE

## 2020-12-03 PROCEDURE — G0463 HOSPITAL OUTPT CLINIC VISIT: HCPCS

## 2020-12-03 ASSESSMENT — PAIN SCALES - GENERAL: PAINLEVEL: MILD PAIN (2)

## 2020-12-03 ASSESSMENT — MIFFLIN-ST. JEOR: SCORE: 1523.35

## 2020-12-03 ASSESSMENT — ENCOUNTER SYMPTOMS
PARESTHESIAS: 0
NUMBNESS: 0

## 2020-12-03 ASSESSMENT — PATIENT HEALTH QUESTIONNAIRE - PHQ9: SUM OF ALL RESPONSES TO PHQ QUESTIONS 1-9: 3
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
## 2020-12-03 NOTE — NURSING NOTE
"Chief Complaint   Patient presents with     Knee Pain     bilateral         Initial /70   Pulse 78   Temp 96.8  F (36  C) (Tympanic)   Resp 16   Ht 1.676 m (5' 6\")   Wt 58.1 kg (128 lb)   SpO2 100%   BMI 20.66 kg/m   Estimated body mass index is 20.66 kg/m  as calculated from the following:    Height as of this encounter: 1.676 m (5' 6\").    Weight as of this encounter: 58.1 kg (128 lb).    Medication Reconciliation: complete      Norma J. Gosselin, LPN  "

## 2020-12-04 ENCOUNTER — HOSPITAL ENCOUNTER (OUTPATIENT)
Dept: PHYSICAL THERAPY | Facility: OTHER | Age: 20
Setting detail: THERAPIES SERIES
End: 2020-12-04
Attending: FAMILY MEDICINE
Payer: COMMERCIAL

## 2020-12-04 DIAGNOSIS — M25.561 PAIN IN BOTH KNEES, UNSPECIFIED CHRONICITY: ICD-10-CM

## 2020-12-04 DIAGNOSIS — M25.562 PAIN IN BOTH KNEES, UNSPECIFIED CHRONICITY: ICD-10-CM

## 2020-12-04 PROCEDURE — 97110 THERAPEUTIC EXERCISES: CPT | Mod: GP | Performed by: PHYSICAL THERAPIST

## 2020-12-04 PROCEDURE — 97161 PT EVAL LOW COMPLEX 20 MIN: CPT | Mod: GP | Performed by: PHYSICAL THERAPIST

## 2020-12-09 ENCOUNTER — HOSPITAL ENCOUNTER (OUTPATIENT)
Dept: PHYSICAL THERAPY | Facility: OTHER | Age: 20
Setting detail: THERAPIES SERIES
End: 2020-12-09
Attending: FAMILY MEDICINE
Payer: COMMERCIAL

## 2020-12-09 PROCEDURE — 97140 MANUAL THERAPY 1/> REGIONS: CPT | Mod: GP | Performed by: PHYSICAL THERAPIST

## 2020-12-09 PROCEDURE — 97110 THERAPEUTIC EXERCISES: CPT | Mod: GP | Performed by: PHYSICAL THERAPIST
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
## 2020-12-11 ENCOUNTER — HOSPITAL ENCOUNTER (OUTPATIENT)
Dept: PHYSICAL THERAPY | Facility: OTHER | Age: 20
Setting detail: THERAPIES SERIES
End: 2020-12-11
Attending: FAMILY MEDICINE
Payer: COMMERCIAL

## 2020-12-11 PROCEDURE — 97140 MANUAL THERAPY 1/> REGIONS: CPT | Mod: GP | Performed by: PHYSICAL THERAPIST

## 2020-12-11 PROCEDURE — 97110 THERAPEUTIC EXERCISES: CPT | Mod: GP | Performed by: PHYSICAL THERAPIST

## 2020-12-18 ENCOUNTER — HOSPITAL ENCOUNTER (OUTPATIENT)
Dept: PHYSICAL THERAPY | Facility: OTHER | Age: 20
Setting detail: THERAPIES SERIES
End: 2020-12-18
Attending: FAMILY MEDICINE
Payer: COMMERCIAL

## 2020-12-18 PROCEDURE — 97140 MANUAL THERAPY 1/> REGIONS: CPT | Mod: GP | Performed by: PHYSICAL THERAPIST

## 2020-12-18 PROCEDURE — 97110 THERAPEUTIC EXERCISES: CPT | Mod: GP | Performed by: PHYSICAL THERAPIST

## 2021-01-06 ENCOUNTER — HOSPITAL ENCOUNTER (OUTPATIENT)
Dept: PHYSICAL THERAPY | Facility: OTHER | Age: 21
Setting detail: THERAPIES SERIES
End: 2021-01-06
Attending: FAMILY MEDICINE
Payer: COMMERCIAL

## 2021-01-06 PROCEDURE — 97140 MANUAL THERAPY 1/> REGIONS: CPT | Mod: GP | Performed by: PHYSICAL THERAPIST

## 2021-01-06 PROCEDURE — 97110 THERAPEUTIC EXERCISES: CPT | Mod: GP | Performed by: PHYSICAL THERAPIST

## 2021-01-15 ENCOUNTER — HOSPITAL ENCOUNTER (OUTPATIENT)
Dept: PHYSICAL THERAPY | Facility: OTHER | Age: 21
Setting detail: THERAPIES SERIES
End: 2021-01-15
Attending: FAMILY MEDICINE
Payer: COMMERCIAL

## 2021-01-15 PROCEDURE — 97140 MANUAL THERAPY 1/> REGIONS: CPT | Mod: GP | Performed by: PHYSICAL THERAPIST

## 2021-01-15 PROCEDURE — 97110 THERAPEUTIC EXERCISES: CPT | Mod: GP | Performed by: PHYSICAL THERAPIST

## 2021-02-13 ENCOUNTER — OFFICE VISIT (OUTPATIENT)
Dept: FAMILY MEDICINE | Facility: OTHER | Age: 21
End: 2021-02-13
Attending: FAMILY MEDICINE
Payer: COMMERCIAL

## 2021-02-13 VITALS
SYSTOLIC BLOOD PRESSURE: 118 MMHG | BODY MASS INDEX: 20.17 KG/M2 | HEIGHT: 66 IN | DIASTOLIC BLOOD PRESSURE: 60 MMHG | HEART RATE: 80 BPM | TEMPERATURE: 97.4 F | RESPIRATION RATE: 18 BRPM | WEIGHT: 125.5 LBS | OXYGEN SATURATION: 98 %

## 2021-02-13 DIAGNOSIS — R11.0 NAUSEA: ICD-10-CM

## 2021-02-13 DIAGNOSIS — R52 BODY ACHES: ICD-10-CM

## 2021-02-13 DIAGNOSIS — R42 DIZZINESS: Primary | ICD-10-CM

## 2021-02-13 DIAGNOSIS — H69.92 DYSFUNCTION OF LEFT EUSTACHIAN TUBE: ICD-10-CM

## 2021-02-13 DIAGNOSIS — L21.9 SEBORRHEIC DERMATITIS: ICD-10-CM

## 2021-02-13 PROCEDURE — C9803 HOPD COVID-19 SPEC COLLECT: HCPCS

## 2021-02-13 PROCEDURE — G0463 HOSPITAL OUTPT CLINIC VISIT: HCPCS

## 2021-02-13 PROCEDURE — U0003 INFECTIOUS AGENT DETECTION BY NUCLEIC ACID (DNA OR RNA); SEVERE ACUTE RESPIRATORY SYNDROME CORONAVIRUS 2 (SARS-COV-2) (CORONAVIRUS DISEASE [COVID-19]), AMPLIFIED PROBE TECHNIQUE, MAKING USE OF HIGH THROUGHPUT TECHNOLOGIES AS DESCRIBED BY CMS-2020-01-R: HCPCS | Mod: ZL | Performed by: FAMILY MEDICINE

## 2021-02-13 PROCEDURE — 99213 OFFICE O/P EST LOW 20 MIN: CPT | Performed by: FAMILY MEDICINE

## 2021-02-13 PROCEDURE — U0005 INFEC AGEN DETEC AMPLI PROBE: HCPCS | Mod: ZL | Performed by: FAMILY MEDICINE

## 2021-02-13 RX ORDER — LORATADINE 10 MG/1
10 TABLET ORAL DAILY
Qty: 30 TABLET | Refills: 1 | Status: SHIPPED | OUTPATIENT
Start: 2021-02-13 | End: 2021-09-24

## 2021-02-13 RX ORDER — FLUTICASONE PROPIONATE 50 MCG
1 SPRAY, SUSPENSION (ML) NASAL DAILY
Qty: 16 G | Refills: 1 | Status: SHIPPED | OUTPATIENT
Start: 2021-02-13

## 2021-02-13 RX ORDER — KETOCONAZOLE 20 MG/ML
SHAMPOO TOPICAL DAILY PRN
Qty: 360 ML | Refills: 2 | Status: SHIPPED | OUTPATIENT
Start: 2021-02-13

## 2021-02-13 ASSESSMENT — PAIN SCALES - GENERAL: PAINLEVEL: MODERATE PAIN (5)

## 2021-02-13 ASSESSMENT — MIFFLIN-ST. JEOR: SCORE: 1512.01

## 2021-02-13 ASSESSMENT — PATIENT HEALTH QUESTIONNAIRE - PHQ9: SUM OF ALL RESPONSES TO PHQ QUESTIONS 1-9: 10

## 2021-02-13 NOTE — PATIENT INSTRUCTIONS
Patient Education     Earache, No Infection (Adult)   Earaches can happen without an infection. They can occur when air and fluid build up behind the eardrum. They may cause a feeling of fullness and discomfort. They may also impair hearing. This is called otitis media with effusion (OME) or serous otitis media. It means there is fluid in the middle ear. It is not the same as acute otitis media, which is often from an infection.  OME can happen when you have a cold if congestion blocks the passage that drains the middle ear. This passage is called the eustachian tube. OME may also occur with nasal allergies or after a bacterial infection in the middle ear. Other causes are:    Trauma    Improper cleaning of wax from the ear    Bacterial infection of the mastoid bone (mastoiditis)    Tumor    Jaw pain    Changes in pressure, such as from flying or scuba diving    The pain or discomfort may come and go. You may hear clicking or popping sounds when you chew or swallow. You may feel that your balance is off. Or you may hear ringing in the ear.  It often takes from several weeks up to 3 months for the fluid to clear on its own. Oral pain relievers and ear drops help if there is pain. Decongestants and antihistamines sometimes help. Antibiotics don't help since there is no infection. Your healthcare provider may give you a nasal spray to help reduce swelling in the nose and eustachian tube. This can allow the ear to drain.  If your OME doesn't get better after 3 months, surgery may be used to drain the fluid. A small tube may also be put in the eardrum to help with drainage.  Because the middle ear fluid can become infected, watch for signs of an infection. These may develop later. They may include increased ear pain, fever, or drainage from the ear.  Home care  These home-care tips will help you take care of yourself:    You may use over-the-counter medicine as directed by your healthcare provider to control pain, unless  medicine was prescribed. If you have chronic liver or kidney disease or ever had a stomach ulcer or GI bleeding, talk with your healthcare provider before using any medicines.    Aspirin should never be used in anyone younger than age 18 who has a fever. It may cause severe liver damage.    Ask your healthcare provider if you may use over-the-counter decongestants such as phenylephrine or pseudoephedrine. Keep in mind they are not always helpful.    Talk with your healthcare provider about using nasal spray decongestants. Don't use them for more than 3 days, or as directed by your healthcare provider. Longer use can make congestion worse. Prescription nasal sprays from your healthcare provider don't often have such restrictions.    Antihistamines may help if you are also having allergy symptoms.    You may use medicines such as guaifenesin to thin mucus and help with drainage.  Follow-up care  Follow up with your healthcare provider or as advised if you are not feeling better after 3 days.  When to seek medical advice  Call your healthcare provider right away if any of these occur:    Ear pain that gets worse or that does not start to get better     Fever of 100.4 F (38 C) or higher, or as directed by your healthcare provider    Fluid or blood draining from the ear    Headache or sinus pain    Stiff neck    Unusual drowsiness or confusion  [employer] last reviewed this educational content on 12/1/2019 2000-2020 [employer]. [address] [place] [address], [place] [address]. All rights reserved. This information is not intended as a substitute for professional medical care. Always follow your healthcare professional's instructions.         Patient Education     Understanding Seborrheic Dermatitis    Seborrheic dermatitis is a common type of rash that causes red, scaly, greasy skin. It occurs on skin that has oil glands. These include the face, upper chest, and scalp, where it is often called dandruff. It tends  to last a long time, or go away and come back. Seborrheic dermatitis is not spread from person to person.   How to say it  [id number]  What causes seborrheic dermatitis?  Experts don't know what causes it. It may be partly caused by a type of yeast that grows on skin, along with extra oil production. Experts are still learning more. It s more common in men than women, and it can occur at any age. It happens more often in people with HIV/AIDS, Parkinson disease, alcoholic pancreatitis, hepatitis, or cancer. It can also get worse during times of stress.  Symptoms of seborrheic dermatitis  Symptoms can include skin that is:  Bumpy  Covered with yellow scales or crusts  Cracked  Greasy  Itchy  Leaking fluid  Painful  Red or orange  These symptoms can occur on skin:  Around the nose  Behind the ears  In the beard  In the eyebrows  On the scalp, also known as dandruff  On the upper chest  You may also have acne, inflamed eyelids (blepharitis), or other skin conditions at the same time.  Treatment for seborrheic dermatitis  Treatment can reduce symptoms for a period of time. The types of treatments most often used include:  Antifungal shampoo, body wash, or cream. These contain medicines such as ketoconazole, fluconazole, selenium sulfide, ciclopirox, pyrithione zinc, or tea tree oil.  Corticosteroid cream or ointment. These contain medicines such as hydrocortisone.  Calcineurin inhibitor cream or ointment. These contain medicines such as pimecrolimus or tacrolimus.  Shampoo or cream with other medicines. These contain medicines such as coal tar, salicylic acid, or zinc pyrithione.  Sodium sulfacetemide creams and washes. These may also help.  In some cases one treatment will stop working after a while. Switching between treatments every few months may be helpful.  Wash your skin gently. You can remove scales with oil and gentle rubbing or a brush.  Living with seborrheic dermatitis  Seborrheic dermatitis  is an ongoing (chronic) condition. It can go away and then come back. You will likely need to use shampoo, cream, or ointment with medicine once or twice a week. This can help to keep symptoms from coming back or getting worse.  When to call your healthcare provider  Call your healthcare provider right away if you have any of these:  Symptoms that don t get better, or get worse  New symptoms  [employer] last reviewed this educational content on 11/1/2019 2000-2020 [employer]. [address] [place] [address], [place] [address]. All rights reserved. This information is not intended as a substitute for professional medical care. Always follow your healthcare professional's instructions.

## 2021-02-13 NOTE — LETTER
February 13, 2021      Slim Bueno  [address]   [facility] [id number]        To Whom It May Concern,     Slim was seen in the [facility] today for acute illness; please excuse from work 2/12/2021 and today, 2/13/2021.         Sincerely,          Leticia Espitia, DO  Family Practice
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
## 2021-02-14 LAB
LABORATORY COMMENT REPORT: NORMAL
SARS-COV-2 RNA RESP QL NAA+PROBE: NEGATIVE
SARS-COV-2 RNA RESP QL NAA+PROBE: NORMAL
SPECIMEN SOURCE: NORMAL
SPECIMEN SOURCE: NORMAL

## 2021-04-25 ENCOUNTER — HEALTH MAINTENANCE LETTER (OUTPATIENT)
Age: 21
End: 2021-04-25

## 2021-09-15 ENCOUNTER — HOSPITAL ENCOUNTER (EMERGENCY)
Facility: OTHER | Age: 21
Discharge: HOME | End: 2021-09-16
Attending: FAMILY MEDICINE | Admitting: FAMILY MEDICINE
Payer: COMMERCIAL

## 2021-09-15 ENCOUNTER — APPOINTMENT (OUTPATIENT)
Dept: CT IMAGING | Facility: OTHER | Age: 21
End: 2021-09-15
Attending: FAMILY MEDICINE
Payer: COMMERCIAL

## 2021-09-15 VITALS
BODY MASS INDEX: 20.89 KG/M2 | SYSTOLIC BLOOD PRESSURE: 148 MMHG | OXYGEN SATURATION: 98 % | RESPIRATION RATE: 18 BRPM | HEART RATE: 90 BPM | DIASTOLIC BLOOD PRESSURE: 82 MMHG | HEIGHT: 66 IN | WEIGHT: 130 LBS | TEMPERATURE: 98.4 F

## 2021-09-15 DIAGNOSIS — I73.9 VASOSPASM OF PERIPHERAL ARTERY (H): ICD-10-CM

## 2021-09-15 DIAGNOSIS — R20.2 PARESTHESIA OF RIGHT ARM: ICD-10-CM

## 2021-09-15 LAB
ANION GAP SERPL CALCULATED.3IONS-SCNC: 9 MMOL/L (ref 3–14)
APTT PPP: 36 SECONDS (ref 22–38)
BASOPHILS # BLD AUTO: 0 10E3/UL (ref 0–0.2)
BASOPHILS NFR BLD AUTO: 0 %
BUN SERPL-MCNC: 13 MG/DL (ref 7–25)
CALCIUM SERPL-MCNC: 9.5 MG/DL (ref 8.6–10.3)
CHLORIDE BLD-SCNC: 101 MMOL/L (ref 98–107)
CO2 SERPL-SCNC: 29 MMOL/L (ref 21–31)
CREAT SERPL-MCNC: 1.1 MG/DL (ref 0.7–1.3)
D DIMER PPP FEU-MCNC: <=0.27 UG/ML FEU (ref 0–0.5)
EOSINOPHIL # BLD AUTO: 0.1 10E3/UL (ref 0–0.7)
EOSINOPHIL NFR BLD AUTO: 1 %
ERYTHROCYTE [DISTWIDTH] IN BLOOD BY AUTOMATED COUNT: 11.5 % (ref 10–15)
GFR SERPL CREATININE-BSD FRML MDRD: >90 ML/MIN/1.73M2
GLUCOSE BLD-MCNC: 81 MG/DL (ref 70–105)
HCT VFR BLD AUTO: 45.9 % (ref 40–53)
HGB BLD-MCNC: 15.6 G/DL (ref 13.3–17.7)
IMM GRANULOCYTES # BLD: 0 10E3/UL
IMM GRANULOCYTES NFR BLD: 0 %
INR PPP: 1.21 (ref 0.85–1.15)
LYMPHOCYTES # BLD AUTO: 2.4 10E3/UL (ref 0.8–5.3)
LYMPHOCYTES NFR BLD AUTO: 34 %
MCH RBC QN AUTO: 28.4 PG (ref 26.5–33)
MCHC RBC AUTO-ENTMCNC: 34 G/DL (ref 31.5–36.5)
MCV RBC AUTO: 84 FL (ref 78–100)
MONOCYTES # BLD AUTO: 0.6 10E3/UL (ref 0–1.3)
MONOCYTES NFR BLD AUTO: 8 %
NEUTROPHILS # BLD AUTO: 4 10E3/UL (ref 1.6–8.3)
NEUTROPHILS NFR BLD AUTO: 57 %
NRBC # BLD AUTO: 0 10E3/UL
NRBC BLD AUTO-RTO: 0 /100
PLATELET # BLD AUTO: 299 10E3/UL (ref 150–450)
POTASSIUM BLD-SCNC: 3.4 MMOL/L (ref 3.5–5.1)
RBC # BLD AUTO: 5.5 10E6/UL (ref 4.4–5.9)
SODIUM SERPL-SCNC: 139 MMOL/L (ref 134–144)
WBC # BLD AUTO: 7.1 10E3/UL (ref 4–11)

## 2021-09-15 PROCEDURE — 73206 CT ANGIO UPR EXTRM W/O&W/DYE: CPT | Mod: TC,RT

## 2021-09-15 PROCEDURE — 80048 BASIC METABOLIC PNL TOTAL CA: CPT | Performed by: FAMILY MEDICINE

## 2021-09-15 PROCEDURE — 85025 COMPLETE CBC W/AUTO DIFF WBC: CPT | Performed by: FAMILY MEDICINE

## 2021-09-15 PROCEDURE — 36415 COLL VENOUS BLD VENIPUNCTURE: CPT | Performed by: FAMILY MEDICINE

## 2021-09-15 PROCEDURE — 85610 PROTHROMBIN TIME: CPT | Performed by: FAMILY MEDICINE

## 2021-09-15 PROCEDURE — 250N000011 HC RX IP 250 OP 636: Performed by: FAMILY MEDICINE

## 2021-09-15 PROCEDURE — 85730 THROMBOPLASTIN TIME PARTIAL: CPT | Performed by: FAMILY MEDICINE

## 2021-09-15 PROCEDURE — 96374 THER/PROPH/DIAG INJ IV PUSH: CPT | Performed by: FAMILY MEDICINE

## 2021-09-15 PROCEDURE — 85379 FIBRIN DEGRADATION QUANT: CPT | Performed by: FAMILY MEDICINE

## 2021-09-15 PROCEDURE — 99283 EMERGENCY DEPT VISIT LOW MDM: CPT | Performed by: FAMILY MEDICINE

## 2021-09-15 PROCEDURE — 99285 EMERGENCY DEPT VISIT HI MDM: CPT | Mod: 25 | Performed by: FAMILY MEDICINE

## 2021-09-15 RX ORDER — IOPAMIDOL 755 MG/ML
100 INJECTION, SOLUTION INTRAVASCULAR ONCE
Status: COMPLETED | OUTPATIENT
Start: 2021-09-15 | End: 2021-09-15

## 2021-09-15 RX ADMIN — IOPAMIDOL 100 ML: 755 INJECTION, SOLUTION INTRAVENOUS at 22:56

## 2021-09-15 ASSESSMENT — ENCOUNTER SYMPTOMS
NECK PAIN: 0
BRUISES/BLEEDS EASILY: 0
DIARRHEA: 0
NAUSEA: 0
DYSURIA: 0
HEADACHES: 0
VOMITING: 0
WEAKNESS: 1
NUMBNESS: 1
MYALGIAS: 1
COLOR CHANGE: 1
PALPITATIONS: 0
CHILLS: 0
COUGH: 0
SORE THROAT: 0
ARTHRALGIAS: 0
SHORTNESS OF BREATH: 0
FEVER: 0
ABDOMINAL PAIN: 0

## 2021-09-15 ASSESSMENT — MIFFLIN-ST. JEOR: SCORE: 1532.43

## 2021-09-15 NOTE — Clinical Note
Slim Bueno was seen and treated in our emergency department on 9/15/2021.  He may return to work on 09/17/2021.       If you have any questions or concerns, please don't hesitate to call.      Johnathan Oquendo MD

## 2021-09-16 PROCEDURE — 250N000013 HC RX MED GY IP 250 OP 250 PS 637: Performed by: FAMILY MEDICINE

## 2021-09-16 PROCEDURE — 250N000011 HC RX IP 250 OP 636: Performed by: FAMILY MEDICINE

## 2021-09-16 RX ORDER — KETOROLAC TROMETHAMINE 15 MG/ML
15 INJECTION, SOLUTION INTRAMUSCULAR; INTRAVENOUS ONCE
Status: COMPLETED | OUTPATIENT
Start: 2021-09-16 | End: 2021-09-16

## 2021-09-16 RX ORDER — ACETAMINOPHEN 500 MG
1000 TABLET ORAL ONCE
Status: COMPLETED | OUTPATIENT
Start: 2021-09-16 | End: 2021-09-16

## 2021-09-16 RX ADMIN — KETOROLAC TROMETHAMINE 15 MG: 15 INJECTION, SOLUTION INTRAMUSCULAR; INTRAVENOUS at 00:17

## 2021-09-16 RX ADMIN — ACETAMINOPHEN 1000 MG: 500 TABLET, FILM COATED ORAL at 00:17
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
## 2021-09-23 ENCOUNTER — HOSPITAL ENCOUNTER (EMERGENCY)
Facility: OTHER | Age: 21
Discharge: HOME | End: 2021-09-23
Attending: PHYSICIAN ASSISTANT | Admitting: PHYSICIAN ASSISTANT
Payer: COMMERCIAL

## 2021-09-23 VITALS
BODY MASS INDEX: 20.09 KG/M2 | DIASTOLIC BLOOD PRESSURE: 70 MMHG | TEMPERATURE: 98.4 F | SYSTOLIC BLOOD PRESSURE: 115 MMHG | HEART RATE: 16 BPM | HEIGHT: 66 IN | OXYGEN SATURATION: 99 % | RESPIRATION RATE: 14 BRPM | WEIGHT: 125 LBS

## 2021-09-23 DIAGNOSIS — R11.0 NAUSEA: ICD-10-CM

## 2021-09-23 DIAGNOSIS — R42 LIGHTHEADEDNESS: ICD-10-CM

## 2021-09-23 LAB — SARS-COV-2 RNA RESP QL NAA+PROBE: NEGATIVE

## 2021-09-23 PROCEDURE — 250N000013 HC RX MED GY IP 250 OP 250 PS 637: Performed by: PHYSICIAN ASSISTANT

## 2021-09-23 PROCEDURE — 99283 EMERGENCY DEPT VISIT LOW MDM: CPT | Performed by: PHYSICIAN ASSISTANT

## 2021-09-23 PROCEDURE — C9803 HOPD COVID-19 SPEC COLLECT: HCPCS | Performed by: PHYSICIAN ASSISTANT

## 2021-09-23 PROCEDURE — U0005 INFEC AGEN DETEC AMPLI PROBE: HCPCS | Performed by: PHYSICIAN ASSISTANT

## 2021-09-23 RX ORDER — MECLIZINE HCL 12.5 MG 12.5 MG/1
50 TABLET ORAL ONCE
Status: COMPLETED | OUTPATIENT
Start: 2021-09-23 | End: 2021-09-23

## 2021-09-23 RX ORDER — MECLIZINE HCL 12.5 MG 12.5 MG/1
50 TABLET ORAL 4 TIMES DAILY PRN
Qty: 30 TABLET | Refills: 0 | Status: SHIPPED | OUTPATIENT
Start: 2021-09-23 | End: 2022-02-04

## 2021-09-23 RX ADMIN — MECLIZINE 50 MG: 12.5 TABLET ORAL at 20:44

## 2021-09-23 ASSESSMENT — MIFFLIN-ST. JEOR: SCORE: 1509.75

## 2021-09-24 ENCOUNTER — OFFICE VISIT (OUTPATIENT)
Dept: INTERNAL MEDICINE | Facility: OTHER | Age: 21
End: 2021-09-24
Attending: STUDENT IN AN ORGANIZED HEALTH CARE EDUCATION/TRAINING PROGRAM
Payer: COMMERCIAL

## 2021-09-24 VITALS
HEART RATE: 81 BPM | WEIGHT: 133.2 LBS | TEMPERATURE: 97.4 F | BODY MASS INDEX: 21.5 KG/M2 | SYSTOLIC BLOOD PRESSURE: 94 MMHG | OXYGEN SATURATION: 98 % | DIASTOLIC BLOOD PRESSURE: 70 MMHG | RESPIRATION RATE: 16 BRPM

## 2021-09-24 DIAGNOSIS — J02.8 SORE THROAT (VIRAL): Primary | ICD-10-CM

## 2021-09-24 DIAGNOSIS — B97.89 SORE THROAT (VIRAL): Primary | ICD-10-CM

## 2021-09-24 PROCEDURE — G0463 HOSPITAL OUTPT CLINIC VISIT: HCPCS

## 2021-09-24 PROCEDURE — 99213 OFFICE O/P EST LOW 20 MIN: CPT | Performed by: STUDENT IN AN ORGANIZED HEALTH CARE EDUCATION/TRAINING PROGRAM

## 2021-09-24 ASSESSMENT — ENCOUNTER SYMPTOMS
SORE THROAT: 1
APPETITE CHANGE: 0
CHEST TIGHTNESS: 0
BACK PAIN: 0
SINUS PAIN: 0
TROUBLE SWALLOWING: 0
APNEA: 0
RHINORRHEA: 0
WHEEZING: 0
WEAKNESS: 0
FACIAL SWELLING: 0
FEVER: 0
NUMBNESS: 0
SHORTNESS OF BREATH: 0
FATIGUE: 0
COLOR CHANGE: 0

## 2021-09-24 ASSESSMENT — PATIENT HEALTH QUESTIONNAIRE - PHQ9: SUM OF ALL RESPONSES TO PHQ QUESTIONS 1-9: 9

## 2021-09-24 ASSESSMENT — PAIN SCALES - GENERAL: PAINLEVEL: EXTREME PAIN (8)

## 2021-09-24 NOTE — PROGRESS NOTES
Mr. Bueno is a 20 year old male who presents to the clinic for ED follow-up    HPI  Patient was seen twice in the last 10 days emergency department.  On 9/15 he was seen for paresthesia of the right arm CT angiogram at that time was negative for any occlusion.    He was seen last night for lightheadedness and nausea over the past 24 hours prior to presentation.  No testing whatsoever was performed other than a Covid test.  He was sent home with some meclizine and instructed to follow-up in primary care clinic.    Throat hurts   Started this morning  Nausea yesterday.   No sick contacts  Lives with fiarline and 6 year old  No illness  Works at [employer].   No fever or chills  No nausea  Can't talk loud due to throat hurting.   No ear pain or pressure  Voice is hoarse  No cough      Review of Systems   Constitutional: Negative for appetite change, fatigue and fever.   HENT: Positive for sore throat. Negative for facial swelling, hearing loss, mouth sores, postnasal drip, rhinorrhea, sinus pain, tinnitus and trouble swallowing.    Respiratory: Negative for apnea, chest tightness, shortness of breath and wheezing.    Musculoskeletal: Negative for back pain.   Skin: Negative for color change and rash.   Neurological: Negative for weakness and numbness.        Reviewed and updated as needed this visit by Provider                  EXAM:   Vitals:    09/24/21 0815   BP: 94/70   BP Location: Right arm   Patient Position: Sitting   Cuff Size: Adult Regular   Pulse: 81   Resp: 16   Temp: 97.4  F (36.3  C)   TempSrc: Tympanic   SpO2: 98%   Weight: 60.4 kg (133 lb 3.2 oz)         BP Readings from Last 3 Encounters:   09/24/21 94/70   09/23/21 115/70   09/15/21 (!) 148/82      Wt Readings from Last 3 Encounters:   09/24/21 60.4 kg (133 lb 3.2 oz)   09/23/21 56.7 kg (125 lb)   09/15/21 59 kg (130 lb)      Estimated body mass index is 21.5 kg/m  as calculated from the following:    Height as of 9/23/21: 1.676 m  "(5' 6\").    Weight as of this encounter: 60.4 kg (133 lb 3.2 oz).     Physical Exam  Vitals and nursing note reviewed.   Constitutional:       General: He is not in acute distress.     Appearance: Normal appearance. He is normal weight. He is not ill-appearing or toxic-appearing.   HENT:      Head: Normocephalic and atraumatic.      Comments: No enlarged tonsils or erythema.  Neurological:      Mental Status: He is alert.          INVESTIGATIONS:  - Labs reviewed in [facility]     ASSESSMENT AND PLAN:    ICD-10-CM    1. Sore throat (viral)  J02.8 phenol (CHLORASEPTIC) 1.4 % spray    B97.89        Assessment/Plan:     Patient with constellation of symptoms including dizziness, lightheadedness and nausea, now is progressed to just a sore throat.  Suspect the patient has a viral illness as he has no peritonsillar enlargement or lymphadenopathy.  Centor criteria scoring for strep screening is one-point which is not recommending strep throat testing or antibiotics.  No sick contacts.  Covid testing from the ED is negative.  Given handouts on sore throat and URI treatment.    Follow-up with me in 2-3 weeks for follow-up of arm concerns.        Electronically signed by:  Negrito Issa MD on 9/24/2021  Internal Medicine  [facility]  "

## 2021-09-24 NOTE — PROGRESS NOTES
Medication Reconciliation: complete   Advance Care Directive Reviewed    FOOD SECURITY SCREENING QUESTIONS  Hunger Vital Signs:  Within the past 12 months we worried whether our food would run out before we got money to buy more. Never  Within the past 12 months the food we bought just didn't last and we didn't have money to get more. Never     Patient is no complaining of a sore throat.  Minda Flores LPN .............9/24/2021  8:20 AM

## 2021-09-24 NOTE — ED TRIAGE NOTES
"ED Nursing Triage Note (General)   ________________________________    Slim Bueno is a 20 year old Male that presents to triage private car  With history of  Dizziness that started last night.  Pt states he now has developed nausea when he bends down.   reported by patient   Significant symptoms had onset 1 day(s) ago.  /70   Pulse (!) 16   Temp 98.4  F (36.9  C) (Temporal)   Resp 14   Ht 1.676 m (5' 6\")   Wt 56.7 kg (125 lb)   SpO2 99%   BMI 20.18 kg/m  t  Patient appears alert , in no acute distress., and cooperative behavior.  Anxiety: none  GCS Total = 15  Airway: intact  Breathing noted as Normal  Circulation Normal  Skin:  Normal  Action taken: Sent to waiting room until bed available.       PRE HOSPITAL PRIOR LIVING SITUATION Significant Other  "

## 2021-09-24 NOTE — PATIENT INSTRUCTIONS
Nasal/Sinus Congestion:  - Nasal decongestant (Sudafed or equivalent)  - Saline nasal spray (Ocean or equivalent)  - Antihistamine (Claritin or equivalent)    Cough:  - Robitussin or Mucinex  - Cough drops as needed  - Nyquil at night as needed    Sore Throat:  - Throat lozenges as needed  - Tea or warm water with honey  - Tylenol as needed  - Ibuprofen with food    Remember:  - Hydration and rest!          Patient Education     Self-Care for Sore Throats     Sore throats happen for many reasons, such as colds, allergies, cigarette smoke, air pollution, and infections caused by viruses or bacteria. In any case, your throat becomes red and sore. Your goal for self-care is to reduce your discomfort while giving your throat a chance to heal.  Moisten and soothe your throat  Tips include the following:    Try a sip of water first thing after waking up.    Keep your throat moist by drinking 6 or more glasses of clear liquids every day.    Run a cool-air humidifier in your room overnight.    Stay away from cigarette smoke.     Check the air quality index,if air pollution gives you a sore throat. On high pollution days, try to limit outdoor time.    Suck on throat lozenges, cough drops, hard candy, ice chips, or frozen fruit-juice bars. Use the sugar-free versions if your diet or medical condition requires them.  Gargle to ease irritation  Gargling every hour or 2 can ease irritation. Try gargling with 1 of these solutions:    1/4 teaspoon of salt in 1/2 cup of warm water    An over-the-counter anesthetic gargle  Use medicine for more relief  Over-the-counter medicine can reduce sore throat symptoms. Ask your pharmacist if you have questions about which medicine to use. To prevent possible medicine interactions, let the pharmacist know what medicines you take. To decrease symptoms:    Ease pain with anesthetic sprays. Aspirin or an aspirin substitute also helps. Remember, never give aspirin to anyone 18 or younger. Don't  take aspirin if you are already taking blood thinners.     For sore throats caused by allergies, try antihistamines to block the allergic reaction.  Unless a sore throat is caused by a bacterial infection, antibiotics won t help you.  Prevent future sore throats  Prevention tips include:    Stop smoking or reduce contact with secondhand smoke. Smoke irritates the tender throat lining.    Limit contact with pets and with allergy-causing substances, such as pollen and mold.    Wash your hands often when you re around someone with a sore throat or cold. This will keep viruses or bacteria from spreading.    Limit outdoor time when air pollution is bad.    Don t strain your vocal cords.  When to call your healthcare provider  Contact your healthcare provider if you have:    Fever of 100.4 F (38.0 C) or higher, or as directed by your healthcare provider    White spots on the throat    Great Trouble swallowing    A skin rash    Recent exposure to someone else with strep bacteria    Severe hoarseness and swollen glands in the neck or jaw  Call 911  Call 911 if any of the following occur:    Trouble breathing or catching your breath    Drooling and problems swallowing    Wheezing    Unable to talk    Feeling dizzy or faint    Feeling of doom  Nitesh last reviewed this educational content on 9/1/2019 2000-2021 [employer]. All rights reserved. This information is not intended as a substitute for professional medical care. Always follow your healthcare professional's instructions.           Patient Education     When You Have a Sore Throat  A sore throat can be painful. There are many reasons why you may have a sore throat. Your healthcare provider will work with you to find the cause of your sore throat. He or she will also find the best treatment for you.     What causes a sore throat?  Sore throats can be caused or worsened by:     Cold or flu viruses    Bacteria    Irritants such as tobacco smoke or air  pollution    Acid reflux  A healthy throat  The tonsils are on the sides of the throat near the base of the tongue. They collect viruses and bacteria and help fight infection. The throat (pharynx) is the passage for air. Mucus from the nasal cavity also moves down the passage.   An inflamed throat  The tonsils and pharynx can become inflamed due to a cold or flu virus. Postnasal drip (excess mucus draining from the nasal cavity) can irritate the throat. It can also make the throat or tonsils more likely to be infected by bacteria. Severe, untreated tonsillitis in children or adults can cause a pocket of pus (abscess) to form near the tonsil.   Your evaluation  A health evaluation can help find the cause of your sore throat. It can also help your healthcare provider choose the best treatment for you. The evaluation may include a health history, physical exam, and diagnostic tests.   Health history  Your healthcare provider may ask you the following:     How long has the sore throat lasted and how have you been treating it?    Do you have any other symptoms, such as body aches, fever, or cough?    Does your sore throat recur? If so, how often? How many days of school or work have you missed because of a sore throat?    Do you have trouble eating or swallowing?    Have you been told that you snore or have other sleep problems?    Do you have bad breath?    Do you cough up bad-tasting mucus?  Physical exam  During the exam, your healthcare provider checks your ears, nose, and throat for problems. He or she also checks for swelling in the neck, and may listen to your chest.   Possible tests  Other tests your healthcare provider may perform include:     A throat swab to check for bacteria such as streptococcus (the bacteria that causes strep throat)    A blood test to check for mononucleosis (a viral infection)    A chest X-ray to rule out pneumonia, especially if you have a cough  Treating a sore throat  Treatment  depends on many factors. What is the likely cause? Is the problem recent? Does it keep coming back? In many cases, the best thing to do is to treat the symptoms, rest, and let the problem heal itself. Antibiotics may help clear up some bacterial infections. For cases of severe or recurring tonsillitis, the tonsils may need to be removed.   Relieving your symptoms    Don t smoke, and stay away from secondhand smoke.    For children, try throat sprays or frozen ice pops. Adults and older children may try lozenges.    Drink warm liquids to soothe the throat and help thin mucus. Stay away from alcohol, spicy foods, and acidic drinks such as orange juice. These can irritate the throat.    Gargle with warm saltwater ( 1 teaspoon of salt to  8 ounces of warm water).    Use a humidifier to keep air moist and relieve throat dryness.    Try over-the-counter pain relievers such as acetaminophen or ibuprofen. Use as directed, and don t exceed the recommended dose. Don t give aspirin to children under age17.    Are antibiotics needed?  If your sore throat is due to a bacterial infection, antibiotics may speed healing and prevent complications. Although group A streptococcus (strep throat) is the major treatable infection for a sore throat, strep throat causes only 5% to 15% of sore throats in adults who seek medical care. Most sore throats are caused by cold or flu viruses. And antibiotics don t treat viral illness. In fact, using antibiotics when they re not needed may lead to bacteria that are harder to kill. Your healthcare provider will prescribe antibiotics only if he or she thinks they are likely to help.   If antibiotics are prescribed  Take the medicine exactly as directed. Be sure to finish your prescription even if you re feeling better. Ask your healthcare provider or pharmacist what side effects are common and what to do about them.   Is surgery needed?  In some cases, tonsils need to be removed. This is often done  as outpatient (same-day) surgery. Your healthcare provider may advise removing the tonsils in cases of:     Several severe bouts of tonsillitis in a year.  Severe  episodes include those that lead to missed days of school or work, or that need to be treated with antibiotics.    Tonsillitis that causes breathing problems during sleep    Tonsillitis caused by food particles collecting in pouches in the tonsils (cryptic tonsillitis)  When to call your healthcare provider   Call your healthcare provider immediately if any of the following occur:     Problems swallowing    Symptoms worsen, or new symptoms develop.    Swollen tonsils make breathing difficult.    The pain is severe enough to keep you from drinking liquids.    If a skin rash or hives, develops, call your healthcare provider immediately. Any of these could signal an allergic reaction to antibiotics.    Symptoms don t improve within a week.    Symptoms don t improve within  2 to 3  days of starting antibiotics.  Call 911  Call 911 if any of the following occur:     Trouble breathing or problems catching your breath may be a medical emergency.    Skin is blue, purple or gray in color    Trouble talking    Feeling dizzy or faint    Feeling of doom  Nitesh last reviewed this educational content on 7/1/2019 2000-2021 [employer]. All rights reserved. This information is not intended as a substitute for professional medical care. Always follow your healthcare professional's instructions.
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
## 2021-09-27 ENCOUNTER — ALLIED HEALTH/NURSE VISIT (OUTPATIENT)
Dept: FAMILY MEDICINE | Facility: OTHER | Age: 21
End: 2021-09-27
Attending: FAMILY MEDICINE
Payer: COMMERCIAL

## 2021-09-27 DIAGNOSIS — Z20.822 COVID-19 RULED OUT: Primary | ICD-10-CM

## 2021-09-27 PROCEDURE — U0005 INFEC AGEN DETEC AMPLI PROBE: HCPCS | Mod: ZL

## 2021-09-27 PROCEDURE — C9803 HOPD COVID-19 SPEC COLLECT: HCPCS

## 2021-09-29 LAB — SARS-COV-2 RNA RESP QL NAA+PROBE: NEGATIVE

## 2021-10-01 ENCOUNTER — APPOINTMENT (OUTPATIENT)
Dept: GENERAL RADIOLOGY | Facility: OTHER | Age: 21
End: 2021-10-01
Attending: FAMILY MEDICINE
Payer: COMMERCIAL

## 2021-10-01 PROCEDURE — 71046 X-RAY EXAM CHEST 2 VIEWS: CPT | Mod: TC

## 2021-10-01 PROCEDURE — 99283 EMERGENCY DEPT VISIT LOW MDM: CPT | Performed by: FAMILY MEDICINE

## 2021-10-01 PROCEDURE — 99283 EMERGENCY DEPT VISIT LOW MDM: CPT | Mod: 25 | Performed by: FAMILY MEDICINE

## 2021-10-02 ENCOUNTER — HOSPITAL ENCOUNTER (EMERGENCY)
Facility: OTHER | Age: 21
Discharge: HOME | End: 2021-10-02
Attending: FAMILY MEDICINE | Admitting: FAMILY MEDICINE
Payer: COMMERCIAL

## 2021-10-02 VITALS
OXYGEN SATURATION: 100 % | DIASTOLIC BLOOD PRESSURE: 82 MMHG | TEMPERATURE: 98.4 F | WEIGHT: 133 LBS | HEART RATE: 71 BPM | SYSTOLIC BLOOD PRESSURE: 115 MMHG | RESPIRATION RATE: 16 BRPM | BODY MASS INDEX: 21.47 KG/M2

## 2021-10-02 DIAGNOSIS — J20.9 ACUTE BRONCHITIS, UNSPECIFIED ORGANISM: ICD-10-CM

## 2021-10-02 PROCEDURE — 999N000105 HC STATISTIC NO DOCUMENTATION TO SUPPORT CHARGE

## 2021-10-02 PROCEDURE — 94640 AIRWAY INHALATION TREATMENT: CPT

## 2021-10-02 PROCEDURE — 94664 DEMO&/EVAL PT USE INHALER: CPT

## 2021-10-02 PROCEDURE — 999N000157 HC STATISTIC RCP TIME EA 10 MIN

## 2021-10-02 PROCEDURE — 250N000009 HC RX 250: Performed by: FAMILY MEDICINE

## 2021-10-02 PROCEDURE — 250N000013 HC RX MED GY IP 250 OP 250 PS 637: Performed by: FAMILY MEDICINE

## 2021-10-02 RX ORDER — ALBUTEROL SULFATE 90 UG/1
6 AEROSOL, METERED RESPIRATORY (INHALATION) ONCE
Status: COMPLETED | OUTPATIENT
Start: 2021-10-02 | End: 2021-10-02

## 2021-10-02 RX ORDER — DEXAMETHASONE SODIUM PHOSPHATE 4 MG/ML
6 VIAL (ML) INJECTION ONCE
Status: COMPLETED | OUTPATIENT
Start: 2021-10-02 | End: 2021-10-02

## 2021-10-02 RX ORDER — ALBUTEROL SULFATE 90 UG/1
2 AEROSOL, METERED RESPIRATORY (INHALATION) EVERY 6 HOURS PRN
Qty: 8.5 G | Refills: 0 | Status: SHIPPED | OUTPATIENT
Start: 2021-10-02

## 2021-10-02 RX ADMIN — ALBUTEROL SULFATE 6 PUFF: 90 AEROSOL, METERED RESPIRATORY (INHALATION) at 01:49

## 2021-10-02 RX ADMIN — DEXAMETHASONE SODIUM PHOSPHATE 6 MG: 4 INJECTION, SOLUTION INTRAMUSCULAR; INTRAVENOUS at 01:21
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
## 2021-10-02 NOTE — ED TRIAGE NOTES
ED Nursing Triage Note (General)   ________________________________    Slim Bueno is a 20 year old Male that presents to triage private car with history of fevers reported at home, shortness of breath now resolved and dizziness of which he has been experiencing for 3 years reported by patient   Significant symptoms had onset 8 day(s) ago. Patient received a COVID test last Thursday and Monday both of which were negative.  /73   Pulse 93   Temp 98.4  F (36.9  C) (Tympanic)   Resp 16   Wt 60.3 kg (133 lb)   SpO2 98%   BMI 21.47 kg/m  t  Patient appears alert , in no acute distress., and cooperative behavior.    GCS Eye Opening = 4=Spontaneous  Airway: intact  Breathing noted as Normal  Circulation Normal  Skin:  Normal  Action taken:  In waiting room until bed available       PRE HOSPITAL PRIOR LIVING SITUATION Alone

## 2021-10-04 ASSESSMENT — ENCOUNTER SYMPTOMS
FLANK PAIN: 0
CHILLS: 1
FEVER: 1
CHEST TIGHTNESS: 0

## 2021-10-12 ENCOUNTER — OFFICE VISIT (OUTPATIENT)
Dept: INTERNAL MEDICINE | Facility: OTHER | Age: 21
End: 2021-10-12
Attending: STUDENT IN AN ORGANIZED HEALTH CARE EDUCATION/TRAINING PROGRAM
Payer: COMMERCIAL

## 2021-10-12 VITALS
TEMPERATURE: 96.8 F | RESPIRATION RATE: 18 BRPM | HEART RATE: 73 BPM | WEIGHT: 127 LBS | BODY MASS INDEX: 20.5 KG/M2 | DIASTOLIC BLOOD PRESSURE: 68 MMHG | SYSTOLIC BLOOD PRESSURE: 100 MMHG | OXYGEN SATURATION: 98 %

## 2021-10-12 DIAGNOSIS — R42 DIZZINESS: Primary | ICD-10-CM

## 2021-10-12 DIAGNOSIS — R41.0 CONFUSION: ICD-10-CM

## 2021-10-12 PROCEDURE — 99213 OFFICE O/P EST LOW 20 MIN: CPT | Performed by: STUDENT IN AN ORGANIZED HEALTH CARE EDUCATION/TRAINING PROGRAM

## 2021-10-12 PROCEDURE — G0463 HOSPITAL OUTPT CLINIC VISIT: HCPCS | Performed by: STUDENT IN AN ORGANIZED HEALTH CARE EDUCATION/TRAINING PROGRAM

## 2021-10-12 RX ORDER — MECLIZINE HYDROCHLORIDE 25 MG/1
25 TABLET ORAL 3 TIMES DAILY PRN
Qty: 90 TABLET | Refills: 1 | Status: SHIPPED | OUTPATIENT
Start: 2021-10-12 | End: 2022-06-02

## 2021-10-12 ASSESSMENT — PAIN SCALES - GENERAL: PAINLEVEL: MILD PAIN (2)

## 2021-10-12 NOTE — NURSING NOTE
"Patient comes in for a follow up today.  Lorena Lora LPN ....................10/12/2021   8:15 AM  Chief Complaint   Patient presents with     Follow Up       Initial /68 (BP Location: Right arm, Patient Position: Sitting, Cuff Size: Adult Regular)   Pulse 73   Temp 96.8  F (36  C) (Tympanic)   Resp 18   Wt 57.6 kg (127 lb)   SpO2 98%   BMI 20.50 kg/m   Estimated body mass index is 20.5 kg/m  as calculated from the following:    Height as of 9/23/21: 1.676 m (5' 6\").    Weight as of this encounter: 57.6 kg (127 lb).  Medication Reconciliation: complete    Lorena Lora LPN  FOOD SECURITY SCREENING QUESTIONS  Hunger Vital Signs:  Within the past 12 months we worried whether our food would run out before we got money to buy more. Never  Within the past 12 months the food we bought just didn't last and we didn't have money to get more. Never  Lorena Lora LPN 10/12/2021 8:15 AM    "
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
## 2021-10-27 ENCOUNTER — HOSPITAL ENCOUNTER (OUTPATIENT)
Dept: GENERAL RADIOLOGY | Facility: OTHER | Age: 21
End: 2021-10-27
Attending: FAMILY MEDICINE
Payer: OTHER MISCELLANEOUS

## 2021-10-27 ENCOUNTER — OFFICE VISIT (OUTPATIENT)
Dept: FAMILY MEDICINE | Facility: OTHER | Age: 21
End: 2021-10-27
Attending: FAMILY MEDICINE
Payer: OTHER MISCELLANEOUS

## 2021-10-27 VITALS
WEIGHT: 123 LBS | TEMPERATURE: 98.1 F | DIASTOLIC BLOOD PRESSURE: 64 MMHG | SYSTOLIC BLOOD PRESSURE: 110 MMHG | RESPIRATION RATE: 14 BRPM | OXYGEN SATURATION: 97 % | BODY MASS INDEX: 19.77 KG/M2 | HEART RATE: 90 BPM | HEIGHT: 66 IN

## 2021-10-27 DIAGNOSIS — S80.11XA CONTUSION OF KNEE AND LOWER LEG, RIGHT, INITIAL ENCOUNTER: ICD-10-CM

## 2021-10-27 DIAGNOSIS — M79.661 PAIN OF RIGHT LOWER LEG: ICD-10-CM

## 2021-10-27 DIAGNOSIS — Z02.6 ENCOUNTER RELATED TO WORKER'S COMPENSATION CLAIM: ICD-10-CM

## 2021-10-27 DIAGNOSIS — Z02.6 ENCOUNTER RELATED TO WORKER'S COMPENSATION CLAIM: Primary | ICD-10-CM

## 2021-10-27 DIAGNOSIS — S80.01XA CONTUSION OF KNEE AND LOWER LEG, RIGHT, INITIAL ENCOUNTER: ICD-10-CM

## 2021-10-27 DIAGNOSIS — M76.51 PATELLAR TENDINITIS OF RIGHT KNEE: ICD-10-CM

## 2021-10-27 PROCEDURE — 73562 X-RAY EXAM OF KNEE 3: CPT | Mod: RT

## 2021-10-27 PROCEDURE — 73590 X-RAY EXAM OF LOWER LEG: CPT | Mod: RT

## 2021-10-27 PROCEDURE — 99214 OFFICE O/P EST MOD 30 MIN: CPT | Performed by: FAMILY MEDICINE

## 2021-10-27 PROCEDURE — 73630 X-RAY EXAM OF FOOT: CPT | Mod: RT

## 2021-10-27 RX ORDER — MELOXICAM 15 MG/1
7.5-15 TABLET ORAL DAILY
Qty: 14 TABLET | Refills: 0 | Status: SHIPPED | OUTPATIENT
Start: 2021-10-27 | End: 2021-11-12

## 2021-10-27 ASSESSMENT — PAIN SCALES - GENERAL: PAINLEVEL: MODERATE PAIN (4)

## 2021-10-27 ASSESSMENT — PATIENT HEALTH QUESTIONNAIRE - PHQ9: SUM OF ALL RESPONSES TO PHQ QUESTIONS 1-9: 6

## 2021-10-27 ASSESSMENT — MIFFLIN-ST. JEOR: SCORE: 1496.7

## 2021-10-27 NOTE — NURSING NOTE
"Chief Complaint   Patient presents with     Trauma     WORK COMP     Patient presents today as he was working this past Monday at [employer], he went to open up the cooler door and the hinge fell off, making the door fall on his right leg.  Initial /64   Pulse 90   Temp 98.1  F (36.7  C) (Tympanic)   Resp 14   Ht 1.67 m (5' 5.75\")   Wt 55.8 kg (123 lb)   SpO2 97%   BMI 20.00 kg/m   Estimated body mass index is 20 kg/m  as calculated from the following:    Height as of this encounter: 1.67 m (5' 5.75\").    Weight as of this encounter: 55.8 kg (123 lb).  Medication Reconciliation: complete    Ami Kessler LPN  "

## 2021-10-27 NOTE — PATIENT INSTRUCTIONS
Patient Education     Lower Extremity Bruise  You have a bruise (contusion on a leg, knee, ankle, foot, or toe. Symptoms include pain, swelling, and skin discoloration. No bones are broken. This injury may take from a few days to a few weeks to heal.  During that time, the bruise may change from reddish in color, to purple-blue, to green-yellow, to yellow-brown.   Home care    Unless another medicine was prescribed, you can take acetaminophen, ibuprofen, or naproxen to control pain. Talk with your healthcare provider before using these medicines if you have chronic liver or kidney disease or ever had a stomach ulcer or digestive bleeding.    Elevate the injured area to reduce pain and swelling. As much as possible, sit or lie down with the injured area raised about the level of your heart. This is especially important during the first 48 hours.    Ice the injured area to help reduce pain and swelling. Wrap an ice pack or ice cubes in a plastic bag in a thin towel. Apply to the bruised area for 20 minutes every 1 to 2 hours the first day. Continue this 3 to 4 times a day until the pain and swelling goes away.    If crutches have been advised, don't bear full weight on the injured leg until you can do so without pain. You may return to sports when you are able to put full weight and impact on the injured leg without pain.    Follow up  Follow up with your healthcare provider, or as advised. Call if you are not improving within the next 1 to 2 weeks.   When to seek medical advice   Call your healthcare provider right away if any of these occur:    Increased pain or swelling    Foot or toes become cold, blue, numb or tingly    Signs of infection: Warmth, drainage, or increased redness or pain around the injury    Inability to move the injured area, or any joints below the injured area    Frequent bruising for unknown reasons  [employer] last reviewed this educational content on 8/1/2019 2000-2021 [employer]. All rights reserved. This information is not intended as a substitute for professional medical care. Always follow your healthcare professional's instructions.         Patient Education     Common Kneecap (Patella) Problems  If the kneecap is  off track  even slightly (a tracking problem), it can cause uneven pressure on the back of the kneecap. This can cause pain and difficulty with movements, such as walking and going down stairs. Below are some common causes of kneecap pain.    Cartilage damage  Sometimes the cartilage on the back of the kneecap or in the groove of the thighbone is damaged. Damaged cartilage can t spread pressure evenly. Uneven pressure wears down the cartilage even further.   Dislocation  Sometimes a muscle or ligament in the knee is pulled the wrong way. Or the kneecap may be pushed too hard. Then the kneecap may move partly out of the groove (subluxation). It may even move completely out (dislocation).     Patellar tendinitis  Patellar tendinitis ( jumper s knee ) happens when the quadriceps muscles are overused or tight. During movement, the patellar tendon absorbs more shock than usual. The tendon becomes irritated or damaged.   Plica syndrome  Plica bands are tissue fibers that some people have near the kneecap. They usually cause no problems. But sometimes they can become irritated or inflamed.   [employer] last reviewed this educational content on 5/1/2018 2000-2021 [employer]. All rights reserved. This information is not intended as a substitute for professional medical care. Always follow your healthcare professional's instructions.         Patient Education     Tendonitis  A tendon is the thick fibrous cord that joins muscle to bone and allows joints to move. When a tendon becomes inflamed, it is called tendonitis. This can occur from overuse, injury, or infection. This usually involves the shoulders, forearm, wrist, hands and feet. Symptoms include pain, swelling  and tenderness to the touch. Moving the joint increases the pain.  It takes 4 to 6 weeks or more for tendonitis to completely heal. It is treated by preventing motion of the tendon, occasionally with a splint or brace, and the use of anti-inflammatory medicine.  Home care    Some people find relief with ice packs. These can be crushed or cubed ice in a plastic bag or a bag of frozen vegetables wrapped in a thin towel. Other people get better relief with heat. This can include a hot shower, hot bath, or a moist towel warmed in a microwave. Try each and use the method that feels best, for 15 to 20 minutes several times a day.    Rest the inflamed joint and protect it from movement.    You may use over-the-counter ibuprofen or naproxen to treat pain and inflammation, unless another medicine was prescribed. If you can't take these medicines, acetaminophen may help with the pain, but does not treat inflammation. If you have chronic liver or kidney disease or ever had a stomach ulcer or gastrointestinal bleeding, talk with your doctor before using these medicines.    As your symptoms improve, begin gradual motion at the involved joint.  Follow-up care  Follow up with your healthcare provider if you are not improving after 7-10 days of treatment.  When to seek medical advice  Call your healthcare provider right away if any of these occur:    Redness over the painful area    Increasing pain or swelling at the joint    Fever lasting 24 to 48 hours or chills, or as advised by your healthcare provider  [employer] last reviewed this educational content on 5/1/2018 2000-2021 [employer]. All rights reserved. This information is not intended as a substitute for professional medical care. Always follow your healthcare professional's instructions.

## 2021-10-28 ENCOUNTER — TELEPHONE (OUTPATIENT)
Dept: FAMILY MEDICINE | Facility: OTHER | Age: 21
End: 2021-10-28

## 2021-10-28 NOTE — TELEPHONE ENCOUNTER
Please call the patient.  He needs a note for work.   He is missing today's shift and needs that note that was offered to him yesterday.      Shannan Sumner on 10/28/2021 at 8:18 AM

## 2021-10-28 NOTE — LETTER
October 28, 2021      Slim Bueno  [address]   [facility] [address]        To Whom It May Concern:    Slim Bueno was seen in our clinic yesterday, 10/27/2021. Please excuse absences through 10/28/2021.  He may return to work after that date without restrictions.    Feel free to contact me at the above with any questions or concerns.      Sincerely,          Leticia Espitia, DO  Family Practice

## 2021-10-28 NOTE — TELEPHONE ENCOUNTER
Patient notified and letter put up at the [address] window for patient.  Ami Kessler LPN, NUNUN  10/28/2021  10:21 AM

## 2021-11-01 NOTE — PROGRESS NOTES
Slim Bueno, male, 2000  [address]   [place] [address]      Employer: [employer]    Date of Treatment: 10/27/2021    Date of Accident: 10/26/2021    History: Large metal cooler door broke off of a cracked hinge, and fell down.  He had attempted to help move another co-worker that was in the way, and the top of the door hit just below his R knee and slid down his shin and landed on the top of his R foot.     Exam:  GENERAL APPEARANCE: healthy, alert and no distress  MS: extremities normal- no gross deformities noted  ORTHO: Lower Leg Exam: Inspection: small abrasion at tibial tuberosity  Palpation: Tender: tibial tuberosity, anterior shin, dorsal proximal/mid foot.  Non-tender: lateral/medial calf/posterior LE  Strength: gastrocsoleus: 5/5, anterior tibialis:  5/5, peroneals: 5/5  Ankle Exam: normal  Knee:normal appearance, full range of motion    ANKLE  Inspection:Swelling:none   Tender:none  Non-tender: YES  Range of Motion:normal  Strength:normal  Special tests:negative forced external rotation/eversion, negative Umana sign  Stance: normal    FOOT  foot exam : Inspection Palpation:   Swelling: dorsum swelling, minimal  Tender::peroneal tendon:  no  Non-tender:none  Range of Motion:normal  SKIN: no suspicious lesions or rashes and ecchymoses - R tibial tuberosity; R dorsal foot  PSYCH: mentation appears normal and affect normal/bright      Diagnosis:   1. Encounter related to worker's compensation claim    2. Pain of right lower leg    3. Contusion of knee and lower leg, right, initial encounter    4. Patellar tendinitis of right knee        Work Related:  yes    Lab Results:   Results for orders placed or performed during the hospital encounter of 10/27/21   XR Foot Right G/E 3 Views     Status: None    Narrative    Exam: XR FOOT RIGHT G/E 3 VIEWS    Technique: Right foot, 3 Views    Comparison: None    Exam reason: dorsal mid foot pain; metal cooler door landed on him;  Pain of right lower leg;  Encounter related to worker's compensation  claim    Findings:  No acute fracture or dislocation. Joint spaces are well maintained.      Soft tissues appear normal.      Impression    Impression:  No acute fracture or dislocation.    ZULEMA ABEBE MD         SYSTEM ID:  [id number]   Results for orders placed or performed during the hospital encounter of 10/27/21   XR Knee Right 3 Views     Status: None    Narrative    Exam: XR KNEE RIGHT 3 VIEWS    Technique: Right knee, 3 Views    Comparison: Knee radiographs 12/3/2020    Exam reason: please include tibial tuberosity/area of contusion; had  metal cooler door break off hinges and land just below knee and slide  down shin; Pain of right lower leg; Encounter related to worker's  compensation claim    Findings:  No acute fracture or dislocation. Joint spaces are well maintained.      Soft tissues appear normal. Possible small knee joint effusion.      Impression    Impression:  No acute fracture or dislocation.     Possible small joint effusion.    ZULEMA ABEBE MD         SYSTEM ID:  [id number]   Results for orders placed or performed during the hospital encounter of 10/27/21   XR Tibia & Fibula Right 2 Views     Status: None    Narrative    Exam: XR TIBIA & FIBULA RT 2 VW    Technique: Right tibia and fibula, 2 views    Comparison: None.    Exam reason: Pain of right lower leg; Encounter related to worker's  compensation claim    Findings:  No acute fracture or dislocation. Joint spaces are well maintained.      Soft tissues appear normal.      Impression    Impression:  No acute fracture or dislocation.    ZULEMA ABEBE MD         SYSTEM ID:  [id number]     Xrays personally reviewed and do not show any hairline fracture, effusions or cause for concern for limited mobility.  OK for RICE therapy/continued work after 1-2 days of rest.  Rx for meloxicam sent to pharmacy x 10-14 days.    The employee is UNABLE to return to work until 10/29/2021.  RICE therapy until then,  then no restrictions.    Other restrictions: None    FOLLOW-UP  No follow up is necessary.    Leticia Espitia DO

## 2021-11-02 ENCOUNTER — OFFICE VISIT (OUTPATIENT)
Dept: FAMILY MEDICINE | Facility: OTHER | Age: 21
End: 2021-11-02
Attending: FAMILY MEDICINE
Payer: OTHER MISCELLANEOUS

## 2021-11-02 VITALS
OXYGEN SATURATION: 99 % | BODY MASS INDEX: 20.17 KG/M2 | SYSTOLIC BLOOD PRESSURE: 106 MMHG | RESPIRATION RATE: 16 BRPM | WEIGHT: 124 LBS | HEART RATE: 72 BPM | TEMPERATURE: 98.2 F | DIASTOLIC BLOOD PRESSURE: 70 MMHG

## 2021-11-02 DIAGNOSIS — S80.11XD CONTUSION, KNEE AND LOWER LEG, RIGHT, SUBSEQUENT ENCOUNTER: ICD-10-CM

## 2021-11-02 DIAGNOSIS — M76.51 PATELLAR TENDINITIS OF RIGHT KNEE: ICD-10-CM

## 2021-11-02 DIAGNOSIS — M79.661 PAIN OF RIGHT LOWER LEG: Primary | ICD-10-CM

## 2021-11-02 DIAGNOSIS — Z02.6 ENCOUNTER RELATED TO WORKER'S COMPENSATION CLAIM: ICD-10-CM

## 2021-11-02 DIAGNOSIS — S80.01XD CONTUSION, KNEE AND LOWER LEG, RIGHT, SUBSEQUENT ENCOUNTER: ICD-10-CM

## 2021-11-02 PROCEDURE — 99214 OFFICE O/P EST MOD 30 MIN: CPT | Performed by: FAMILY MEDICINE

## 2021-11-02 PROCEDURE — G0463 HOSPITAL OUTPT CLINIC VISIT: HCPCS | Mod: 25

## 2021-11-02 ASSESSMENT — PAIN SCALES - GENERAL: PAINLEVEL: MODERATE PAIN (5)

## 2021-11-02 NOTE — LETTER
November 2, 2021      Slim Bueno  [address]   [facility] [address]        To Whom It May Concern:    Slim Bueno was seen in our clinic. He should avoid prolonged standing or walking, squatting, kneeling, and lifting as this will worsen a right leg injury. Effectively job duties cannot be fulfilled and he should not work until improved.      Sincerely,      Carlos Farley MD

## 2021-11-02 NOTE — PROGRESS NOTES
"Nursing Notes:   Ayanna Sellers LPN  11/2/2021 11:06 AM  Sign at exiting of workspace  Patient presents to the clinic for follow up work comp.      FOOD SECURITY SCREENING QUESTIONS  Hunger Vital Signs:  Within the past 12 months we worried whether our food would run out before we got money to buy more. Never  Within the past 12 months the food we bought just didn't last and we didn't have money to get more. Never    Advance Care Directive on file? no  Advance Care Directive provided to patient? Yes    Chief Complaint   Patient presents with     Musculoskeletal Problem       Initial /70 (BP Location: Right arm, Patient Position: Sitting, Cuff Size: Adult Regular)   Pulse 72   Temp 98.2  F (36.8  C) (Temporal)   Resp 16   Wt 56.2 kg (124 lb)   SpO2 99%   BMI 20.17 kg/m   Estimated body mass index is 20.17 kg/m  as calculated from the following:    Height as of 10/27/21: 1.67 m (5' 5.75\").    Weight as of this encounter: 56.2 kg (124 lb).  Medication Reconciliation: complete        Ayanna Sellers LPN            Assessment & Plan       ICD-10-CM    1. Pain of right lower leg  M79.661    2. Contusion, knee and lower leg, right, subsequent encounter  S80.01XD     S80.11XD    3. Patellar tendinitis of right knee  M76.51    4. Encounter related to worker's compensation claim  Z02.6      Is a mild patellar tendinitis, likely some contusion of shin muscles, and contusion of the midfoot.  Appropriately received meloxicam to take for his injury.  Was off work and improved slightly,, but returning to work aggravated symptoms.  He does not appear to require further imaging at this time.  Based on previous x-rays, fracture is unlikely to be missed.    We discussed how deep knee bends will aggravate his knee pain.  Prolonged standing and walking could worsen the foot pain.  Lifting will put additional strain on the foot.  Given a letter with work restrictions including limiting standing, walking, lifting, " "squatting, and kneeling.  He can provide a letter to his other employer.  Was given formal work restrictions to provide [facility], where the work comp injury occurred.  Continue with NSAIDs.  Ice if worse.  Follow-up in 10 days to reevaluate    30 minutes spent on the date of the encounter doing chart review, review of test results, interpretation of tests and patient visit     Carlos Farley MD     [facility]      SUBJECTIVE:  20 year old male presents to follow up on a work comp right leg injury from 10/26/21    Notes from Dr Espitia:  \"History: Large metal cooler door broke off of a cracked hinge, and fell down.  He had attempted to help move another co-worker that was in the way, and the top of the door hit just below his R knee and slid down his shin and landed on the top of his R foot.\"    He was off 7 days of work. Tried to go back and had more pain. Could barely work after standing for a while.  Pain at the knee and foot became worse when he tried to squat down to pick something up.    Work injury occurred at [facility].  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  He does not feel capable of performing at work.  His typical work at [employer] would also aggravate the injury and he reports they are willing to accommodate some restrictions and is wondering how to proceed.      REVIEW OF SYSTEMS:    Pertinent items are noted in HPI.    Current Outpatient Medications   Medication Sig Dispense Refill     albuterol (PROAIR HFA/PROVENTIL HFA/VENTOLIN HFA) 108 (90 Base) MCG/ACT inhaler Inhale 2 puffs into the lungs every 6 hours as needed for shortness of breath / dyspnea or wheezing 8.5 g 0     fluticasone (FLONASE) 50 MCG/ACT nasal spray Spray 1 spray into both nostrils daily 16 g 1     ketoconazole (NIZORAL) 2 % external shampoo Apply topically daily as needed for itching or irritation (of scalp/hair) 360 mL 2     meclizine (ANTIVERT) 12.5 MG tablet Take " 4 tablets (50 mg) by mouth 4 times daily as needed for dizziness 30 tablet 0     meclizine (ANTIVERT) 25 MG tablet Take 1 tablet (25 mg) by mouth 3 times daily as needed for dizziness 90 tablet 1     meloxicam (MOBIC) 15 MG tablet Take 0.5-1 tablets (7.5-15 mg) by mouth daily for 14 days 14 tablet 0     phenol (CHLORASEPTIC) 1.4 % spray Take 1 spray (1 mL) by mouth every hour as needed for sore throat 177 mL 1     No Known Allergies    OBJECTIVE:  /70 (BP Location: Right arm, Patient Position: Sitting, Cuff Size: Adult Regular)   Pulse 72   Temp 98.2  F (36.8  C) (Temporal)   Resp 16   Wt 56.2 kg (124 lb)   SpO2 99%   BMI 20.17 kg/m      EXAM:  General Appearance: Alert. No acute distress  Psychiatric: Normal affect and mentation  Musculoskeletal: No knee effusion.  He has some tenderness anteriorly over the right patella with minimal prepatellar bursitis.  Tender in anterior tibialis.  Nontender over tibia.  Tender over dorsal foot.  Nontender over toes and metatarsals.  Good toe range of motion.  No increased pain with resisted toe extension.  Flexing of the forefoot increases his pain.

## 2021-11-02 NOTE — NURSING NOTE
"Patient presents to the clinic for follow up work comp.      FOOD SECURITY SCREENING QUESTIONS  Hunger Vital Signs:  Within the past 12 months we worried whether our food would run out before we got money to buy more. Never  Within the past 12 months the food we bought just didn't last and we didn't have money to get more. Never    Advance Care Directive on file? no  Advance Care Directive provided to patient? Yes    Chief Complaint   Patient presents with     Musculoskeletal Problem       Initial /70 (BP Location: Right arm, Patient Position: Sitting, Cuff Size: Adult Regular)   Pulse 72   Temp 98.2  F (36.8  C) (Temporal)   Resp 16   Wt 56.2 kg (124 lb)   SpO2 99%   BMI 20.17 kg/m   Estimated body mass index is 20.17 kg/m  as calculated from the following:    Height as of 10/27/21: 1.67 m (5' 5.75\").    Weight as of this encounter: 56.2 kg (124 lb).  Medication Reconciliation: complete        Ayanna Sellers LPN       "

## 2021-11-12 ENCOUNTER — OFFICE VISIT (OUTPATIENT)
Dept: FAMILY MEDICINE | Facility: OTHER | Age: 21
End: 2021-11-12
Attending: FAMILY MEDICINE
Payer: OTHER MISCELLANEOUS

## 2021-11-12 VITALS
DIASTOLIC BLOOD PRESSURE: 66 MMHG | RESPIRATION RATE: 16 BRPM | SYSTOLIC BLOOD PRESSURE: 92 MMHG | OXYGEN SATURATION: 95 % | TEMPERATURE: 97.3 F | HEART RATE: 80 BPM

## 2021-11-12 DIAGNOSIS — S80.11XD CONTUSION, KNEE AND LOWER LEG, RIGHT, SUBSEQUENT ENCOUNTER: ICD-10-CM

## 2021-11-12 DIAGNOSIS — M79.661 PAIN OF RIGHT LOWER LEG: Primary | ICD-10-CM

## 2021-11-12 DIAGNOSIS — Z02.6 ENCOUNTER RELATED TO WORKER'S COMPENSATION CLAIM: ICD-10-CM

## 2021-11-12 DIAGNOSIS — M76.51 PATELLAR TENDINITIS OF RIGHT KNEE: ICD-10-CM

## 2021-11-12 DIAGNOSIS — S80.01XD CONTUSION, KNEE AND LOWER LEG, RIGHT, SUBSEQUENT ENCOUNTER: ICD-10-CM

## 2021-11-12 PROCEDURE — 99213 OFFICE O/P EST LOW 20 MIN: CPT | Performed by: FAMILY MEDICINE

## 2021-11-12 ASSESSMENT — PAIN SCALES - GENERAL: PAINLEVEL: MILD PAIN (3)

## 2021-11-12 NOTE — PROGRESS NOTES
"Nursing Notes:   Ayanna Sellers LPN  11/12/2021  9:59 AM  Sign at exiting of workspace  Patient presents to the clinic for follow up with work comp.      FOOD SECURITY SCREENING QUESTIONS  Hunger Vital Signs:  Within the past 12 months we worried whether our food would run out before we got money to buy more. Never  Within the past 12 months the food we bought just didn't last and we didn't have money to get more. Never    Advance Care Directive on file? no  Advance Care Directive provided to patient? Declined.     Chief Complaint   Patient presents with     Work Comp     follow up       Initial BP 92/66 (BP Location: Right arm, Patient Position: Sitting, Cuff Size: Adult Regular)   Pulse 80   Temp 97.3  F (36.3  C) (Tympanic)   Resp 16   SpO2 95%  Estimated body mass index is 20.17 kg/m  as calculated from the following:    Height as of 10/27/21: 1.67 m (5' 5.75\").    Weight as of 11/2/21: 56.2 kg (124 lb).  Medication Reconciliation: complete        Ayanna Sellers LPN         Assessment & Plan       ICD-10-CM    1. Pain of right lower leg  M79.661    2. Contusion, knee and lower leg, right, subsequent encounter  S80.01XD     S80.11XD    3. Patellar tendinitis of right knee  M76.51    4. Encounter related to worker's compensation claim  Z02.6        He is seeing some signs of progress in healing.  Less pain.  Reduced current work restrictions allowing lifting a higher amount of weight with more standing time up to 3 to 4 hours daily.  Updated work restrictions provided for [facility], where the work comp injury occurred.  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    Follow-up in 2 weeks to reassess    Carlos Farley MD     [facility]      SUBJECTIVE:  20 year old male presents to follow up on a work comp right leg injury from 10/26/21 at [facility].     Notes from Dr Espitia:  \"History: Large metal cooler door broke off of a " "cracked hinge, and fell down.  He had attempted to help move another co-worker that was in the way, and the top of the door hit just below his R knee and slid down his shin and landed on the top of his R foot.\"     He was off 7 days of work. Tried to go back and had more pain. Could barely work after standing for a while.  Pain at the knee and foot became worse when he tried to squat down to pick something up.  Therefore, 10 days ago he was placed on light duty.  Shin area improved  Foot and patellar region are still sore  Does not feel capable of performing full work duties, but believes he can make some progress on work restrictions.  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    REVIEW OF SYSTEMS:    Pertinent items are noted in HPI.    Current Outpatient Medications   Medication Sig Dispense Refill     albuterol (PROAIR HFA/PROVENTIL HFA/VENTOLIN HFA) 108 (90 Base) MCG/ACT inhaler Inhale 2 puffs into the lungs every 6 hours as needed for shortness of breath / dyspnea or wheezing 8.5 g 0     fluticasone (FLONASE) 50 MCG/ACT nasal spray Spray 1 spray into both nostrils daily 16 g 1     ketoconazole (NIZORAL) 2 % external shampoo Apply topically daily as needed for itching or irritation (of scalp/hair) 360 mL 2     meclizine (ANTIVERT) 12.5 MG tablet Take 4 tablets (50 mg) by mouth 4 times daily as needed for dizziness 30 tablet 0     meclizine (ANTIVERT) 25 MG tablet Take 1 tablet (25 mg) by mouth 3 times daily as needed for dizziness 90 tablet 1     phenol (CHLORASEPTIC) 1.4 % spray Take 1 spray (1 mL) by mouth every hour as needed for sore throat 177 mL 1     No Known Allergies    OBJECTIVE:  BP 92/66 (BP Location: Right arm, Patient Position: Sitting, Cuff Size: Adult Regular)   Pulse 80   Temp 97.3  F (36.3  C) (Tympanic)   Resp 16   SpO2 95%     EXAM:  Musculoskeletal: No knee " effusion.  He has some mild tenderness anteriorly over the right patella. Mildly tender over dorsal foot.

## 2021-11-12 NOTE — NURSING NOTE
"Patient presents to the clinic for follow up with work comp.      FOOD SECURITY SCREENING QUESTIONS  Hunger Vital Signs:  Within the past 12 months we worried whether our food would run out before we got money to buy more. Never  Within the past 12 months the food we bought just didn't last and we didn't have money to get more. Never    Advance Care Directive on file? no  Advance Care Directive provided to patient? Declined.     Chief Complaint   Patient presents with     Work Comp     follow up       Initial BP 92/66 (BP Location: Right arm, Patient Position: Sitting, Cuff Size: Adult Regular)   Pulse 80   Temp 97.3  F (36.3  C) (Tympanic)   Resp 16   SpO2 95%  Estimated body mass index is 20.17 kg/m  as calculated from the following:    Height as of 10/27/21: 1.67 m (5' 5.75\").    Weight as of 11/2/21: 56.2 kg (124 lb).  Medication Reconciliation: complete        Ayanna Sellers LPN    "

## 2021-11-29 ENCOUNTER — OFFICE VISIT (OUTPATIENT)
Dept: FAMILY MEDICINE | Facility: OTHER | Age: 21
End: 2021-11-29
Attending: FAMILY MEDICINE
Payer: OTHER MISCELLANEOUS

## 2021-11-29 VITALS
TEMPERATURE: 98.1 F | HEART RATE: 108 BPM | SYSTOLIC BLOOD PRESSURE: 90 MMHG | RESPIRATION RATE: 18 BRPM | DIASTOLIC BLOOD PRESSURE: 62 MMHG | OXYGEN SATURATION: 97 %

## 2021-11-29 DIAGNOSIS — Z02.6 ENCOUNTER RELATED TO WORKER'S COMPENSATION CLAIM: Primary | ICD-10-CM

## 2021-11-29 DIAGNOSIS — S80.11XD CONTUSION, KNEE AND LOWER LEG, RIGHT, SUBSEQUENT ENCOUNTER: ICD-10-CM

## 2021-11-29 DIAGNOSIS — M76.51 PATELLAR TENDINITIS OF RIGHT KNEE: ICD-10-CM

## 2021-11-29 DIAGNOSIS — S80.01XD CONTUSION, KNEE AND LOWER LEG, RIGHT, SUBSEQUENT ENCOUNTER: ICD-10-CM

## 2021-11-29 PROCEDURE — 99213 OFFICE O/P EST LOW 20 MIN: CPT | Performed by: FAMILY MEDICINE

## 2021-11-29 RX ORDER — MELOXICAM 15 MG/1
15 TABLET ORAL DAILY
Qty: 30 TABLET | Refills: 0 | Status: SHIPPED | OUTPATIENT
Start: 2021-11-29 | End: 2021-12-29

## 2021-11-29 ASSESSMENT — PAIN SCALES - GENERAL: PAINLEVEL: MODERATE PAIN (5)

## 2021-11-29 NOTE — LETTER
November 29, 2021      Slim Bueno  [address]   [facility] [id number]        To Whom It May Concern:    Slim Bueno was seen in our clinic. He may continue work with the same current restrictions and follow up in 2 weeks      Sincerely,        Carlos Farley MD

## 2021-11-29 NOTE — NURSING NOTE
Patient presents to the clinic for work comp follow up.      FOOD SECURITY SCREENING QUESTIONS  Hunger Vital Signs:  Within the past 12 months we worried whether our food would run out before we got money to buy more. never  Within the past 12 months the food we bought just didn't last and we didn't have money to get more. never    Advance Care Directive on file? no  Advance Care Directive provided to patient? Declined.

## 2021-11-29 NOTE — PROGRESS NOTES
"Nursing Notes:   Ayanna Sellers, LPN  11/29/2021  3:54 PM  Sign at exiting of workspace  Patient presents to the clinic for work comp follow up.      FOOD SECURITY SCREENING QUESTIONS  Hunger Vital Signs:  Within the past 12 months we worried whether our food would run out before we got money to buy more. never  Within the past 12 months the food we bought just didn't last and we didn't have money to get more. never    Advance Care Directive on file? no  Advance Care Directive provided to patient? Declined.          Assessment & Plan       ICD-10-CM    1. Encounter related to worker's compensation claim  Z02.6 meloxicam (MOBIC) 15 MG tablet     Physical Therapy Referral   2. Patellar tendinitis of right knee  M76.51 meloxicam (MOBIC) 15 MG tablet     Physical Therapy Referral   3. Contusion, knee and lower leg, right, subsequent encounter  S80.01XD meloxicam (MOBIC) 15 MG tablet    S80.11XD Physical Therapy Referral     Still no concerning features on exam.  He was improving and then aggravated his injuries kneeling down at work.  Neurovascular[employer] wants to reduce his work capacity.  Therefore, keep the same restrictions at this time.  He had benefit previously on meloxicam.  Start meloxicam 15 mg daily and take until next appointment in a couple weeks.  Referral placed to physical therapy to help with patellar tendinitis, knee contusion, foot discomfort in order to help him safely progress in his abilities and return to full work duties.    Follow-up in 2 weeks to reassess    Carlos Farley MD     [facility]      SUBJECTIVE:  21 year old male presents to follow up on a work comp right leg injury from 10/26/21 at [facility].     Notes from Dr Espitia:  \"History: Large metal cooler door broke off of a cracked hinge, and fell down.  He had attempted to help move another co-worker that was in the way, and the top of the door hit just below his R knee and slid down his shin and landed on the top " "of his R foot.\"     He was off 7 days of work. Tried to go back and had more pain. Could barely work after standing for a while.  Pain at the knee and foot became worse when he tried to squat down to pick something up.  Therefore, he was placed on light duty.  Now has tried to return to more work duties and was able to tolerate until a few days ago when he kneeled down and pain worsened.  Most of his pain is still at the knee and dorsal foot.  Not really having much for shin discomfort.  No knee swelling.  Pain is more anterior in the knee.  Improved with meloxicam, but not currently taking it.    REVIEW OF SYSTEMS:    Pertinent items are noted in HPI.    Current Outpatient Medications   Medication Sig Dispense Refill     albuterol (PROAIR HFA/PROVENTIL HFA/VENTOLIN HFA) 108 (90 Base) MCG/ACT inhaler Inhale 2 puffs into the lungs every 6 hours as needed for shortness of breath / dyspnea or wheezing 8.5 g 0     fluticasone (FLONASE) 50 MCG/ACT nasal spray Spray 1 spray into both nostrils daily 16 g 1     ketoconazole (NIZORAL) 2 % external shampoo Apply topically daily as needed for itching or irritation (of scalp/hair) 360 mL 2     meclizine (ANTIVERT) 12.5 MG tablet Take 4 tablets (50 mg) by mouth 4 times daily as needed for dizziness 30 tablet 0     meclizine (ANTIVERT) 25 MG tablet Take 1 tablet (25 mg) by mouth 3 times daily as needed for dizziness 90 tablet 1     phenol (CHLORASEPTIC) 1.4 % spray Take 1 spray (1 mL) by mouth every hour as needed for sore throat 177 mL 1     No Known Allergies    OBJECTIVE:  BP 90/62 (BP Location: Right arm, Patient Position: Sitting, Cuff Size: Adult Large)   Pulse 108   Temp 98.1  F (36.7  C) (Tympanic)   Resp 18   SpO2 97%     EXAM:  Musculoskeletal: No knee effusion.  He has some mild tenderness anteriorly over the right patella. Mildly tender over dorsal foot.       "

## 2021-11-30 ENCOUNTER — TELEPHONE (OUTPATIENT)
Dept: FAMILY MEDICINE | Facility: OTHER | Age: 21
End: 2021-11-30
Payer: COMMERCIAL

## 2021-11-30 NOTE — TELEPHONE ENCOUNTER
Hello. When you have a chance, will you please close your notes from yesterday? I will need to submit a request to Work Comp for Physical Therapy and they require that I attach the notes. Thank you.  Leisa Rea on 11/30/2021 at 2:42 PM

## 2021-12-13 ENCOUNTER — OFFICE VISIT (OUTPATIENT)
Dept: FAMILY MEDICINE | Facility: OTHER | Age: 21
End: 2021-12-13
Attending: FAMILY MEDICINE
Payer: OTHER MISCELLANEOUS

## 2021-12-13 VITALS
OXYGEN SATURATION: 98 % | WEIGHT: 122 LBS | BODY MASS INDEX: 19.84 KG/M2 | DIASTOLIC BLOOD PRESSURE: 60 MMHG | RESPIRATION RATE: 20 BRPM | TEMPERATURE: 97 F | SYSTOLIC BLOOD PRESSURE: 92 MMHG | HEART RATE: 66 BPM

## 2021-12-13 DIAGNOSIS — Z02.6 ENCOUNTER RELATED TO WORKER'S COMPENSATION CLAIM: Primary | ICD-10-CM

## 2021-12-13 DIAGNOSIS — M76.51 PATELLAR TENDINITIS OF RIGHT KNEE: ICD-10-CM

## 2021-12-13 PROCEDURE — 99213 OFFICE O/P EST LOW 20 MIN: CPT | Performed by: FAMILY MEDICINE

## 2021-12-13 ASSESSMENT — PAIN SCALES - GENERAL: PAINLEVEL: MODERATE PAIN (4)

## 2021-12-13 NOTE — PROGRESS NOTES
"Nursing Notes:   Ayanna Sellers LPN  12/13/2021  9:23 AM  Signed  Patient presents to the clinic for    FOOD SECURITY SCREENING QUESTIONS:    The next two questions are to help us understand your food security.  If you are feeling you need any assistance in this area, we have resources available to support you today.    Hunger Vital Signs:  Within the past 12 months we worried whether our food would run out before we got money to buy more. Never  Within the past 12 months the food we bought just didn't last and we didn't have money to get more. Never    Advance Care Directive on file? no  Advance Care Directive provided to patient? Declined.  Chief Complaint   Patient presents with     Work Comp     follow up       Initial BP 92/60 (BP Location: Right arm, Patient Position: Sitting, Cuff Size: Adult Regular)   Pulse 66   Temp 97  F (36.1  C) (Tympanic)   Resp 20   Wt 55.3 kg (122 lb)   SpO2 98%   BMI 19.84 kg/m   Estimated body mass index is 19.84 kg/m  as calculated from the following:    Height as of 10/27/21: 1.67 m (5' 5.75\").    Weight as of this encounter: 55.3 kg (122 lb).  Medication Reconciliation: complete        Ayanna Sellers LPN            Assessment & Plan       ICD-10-CM    1. Encounter related to worker's compensation claim  Z02.6    2. Patellar tendinitis of right knee  M76.51      Main area of discomfort appears to be patellar tendinitis.  Still no concerning features on exam.   He was improving and then aggravated his injuries kneeling down at work.  2 weeks ago was kept at same work restrictions, but meloxicam 15 mg daily was added.  He is improving.  Therefore reduce restrictions.  May lift up to 40 pounds.  He should still avoid kneeling and squatting. Completed new work forms.  Continue meloxicam.    Starts physical therapy next week  Follow-up in 2 weeks to reassess      Carlos Farley MD     [facility]      SUBJECTIVE:  21 year old male presents to follow up " Spoke with patient     Advised of results and recommendations below   "on a work comp right leg injury from 10/26/21 at [email or web address].     Notes from Dr Espitia:  \"History: Large metal cooler door broke off of a cracked hinge, and fell down.  He had attempted to help move another co-worker that was in the way, and the top of the door hit just below his R knee and slid down his shin and landed on the top of his R foot.\"     He was off 7 days of work. Tried to go back and had more pain. Could barely work after standing for a while.  Pain at the knee and foot became worse when he tried to squat down to pick something up.  Therefore, he was placed on light duty.  When he tried to increase work duties, he had some worsened pain.  Therefore, 2 weeks ago he was restarted on meloxicam.  This is helping discomfort.  Continues having pain in the anterior knee.  Worse with squatting and kneeling.  Foot is improving, less pain present.    Physical therapy scheduled for next week    REVIEW OF SYSTEMS:    Pertinent items are noted in HPI.    Current Outpatient Medications   Medication Sig Dispense Refill     albuterol (PROAIR HFA/PROVENTIL HFA/VENTOLIN HFA) 108 (90 Base) MCG/ACT inhaler Inhale 2 puffs into the lungs every 6 hours as needed for shortness of breath / dyspnea or wheezing 8.5 g 0     fluticasone (FLONASE) 50 MCG/ACT nasal spray Spray 1 spray into both nostrils daily 16 g 1     ketoconazole (NIZORAL) 2 % external shampoo Apply topically daily as needed for itching or irritation (of scalp/hair) 360 mL 2     meclizine (ANTIVERT) 12.5 MG tablet Take 4 tablets (50 mg) by mouth 4 times daily as needed for dizziness 30 tablet 0     meclizine (ANTIVERT) 25 MG tablet Take 1 tablet (25 mg) by mouth 3 times daily as needed for dizziness 90 tablet 1     meloxicam (MOBIC) 15 MG tablet Take 1 tablet (15 mg) by mouth daily 30 tablet 0     phenol (CHLORASEPTIC) 1.4 % spray Take 1 spray (1 mL) by mouth every hour as needed for sore throat 177 mL 1     No Known Allergies    OBJECTIVE:  BP 92/60 (BP " Location: Right arm, Patient Position: Sitting, Cuff Size: Adult Regular)   Pulse 66   Temp 97  F (36.1  C) (Tympanic)   Resp 20   Wt 55.3 kg (122 lb)   SpO2 98%   BMI 19.84 kg/m      EXAM:  Musculoskeletal: No knee effusion.  He has some mild tenderness anteriorly over the right patella.

## 2021-12-13 NOTE — NURSING NOTE
"Patient presents to the clinic for    FOOD SECURITY SCREENING QUESTIONS:    The next two questions are to help us understand your food security.  If you are feeling you need any assistance in this area, we have resources available to support you today.    Hunger Vital Signs:  Within the past 12 months we worried whether our food would run out before we got money to buy more. Never  Within the past 12 months the food we bought just didn't last and we didn't have money to get more. Never    Advance Care Directive on file? no  Advance Care Directive provided to patient? Declined.  Chief Complaint   Patient presents with     Work Comp     follow up       Initial BP 92/60 (BP Location: Right arm, Patient Position: Sitting, Cuff Size: Adult Regular)   Pulse 66   Temp 97  F (36.1  C) (Tympanic)   Resp 20   Wt 55.3 kg (122 lb)   SpO2 98%   BMI 19.84 kg/m   Estimated body mass index is 19.84 kg/m  as calculated from the following:    Height as of 10/27/21: 1.67 m (5' 5.75\").    Weight as of this encounter: 55.3 kg (122 lb).  Medication Reconciliation: complete        Ayanna Sellers LPN       "

## 2021-12-22 ENCOUNTER — HOSPITAL ENCOUNTER (OUTPATIENT)
Dept: PHYSICAL THERAPY | Facility: OTHER | Age: 21
Setting detail: THERAPIES SERIES
End: 2021-12-22
Attending: FAMILY MEDICINE
Payer: OTHER MISCELLANEOUS

## 2021-12-22 DIAGNOSIS — S80.11XD CONTUSION, KNEE AND LOWER LEG, RIGHT, SUBSEQUENT ENCOUNTER: ICD-10-CM

## 2021-12-22 DIAGNOSIS — M76.51 PATELLAR TENDINITIS OF RIGHT KNEE: ICD-10-CM

## 2021-12-22 DIAGNOSIS — Z02.6 ENCOUNTER RELATED TO WORKER'S COMPENSATION CLAIM: ICD-10-CM

## 2021-12-22 DIAGNOSIS — S80.01XD CONTUSION, KNEE AND LOWER LEG, RIGHT, SUBSEQUENT ENCOUNTER: ICD-10-CM

## 2021-12-22 PROCEDURE — 97035 APP MDLTY 1+ULTRASOUND EA 15: CPT | Mod: GP | Performed by: PHYSICAL THERAPIST

## 2021-12-22 PROCEDURE — 97161 PT EVAL LOW COMPLEX 20 MIN: CPT | Mod: GP | Performed by: PHYSICAL THERAPIST

## 2021-12-22 PROCEDURE — 97110 THERAPEUTIC EXERCISES: CPT | Mod: GP | Performed by: PHYSICAL THERAPIST

## 2021-12-22 NOTE — INITIAL ASSESSMENTS
12/22/21 0900   General Information   Type of Visit Initial OP Ortho PT Evaluation   Start of Care Date 12/22/21   Referring Physician Martine   Patient/Family Goals Statement work with no pain   Orders Evaluate and Treat   Certification Required? Yes   Medical Diagnosis Encounter related to worker's compensation claim Z02.6 Patellar tendinitis of right knee M76.51 Contusion, knee and lower leg, right, subsequent encounter S80.01XD, S80.11XD    Surgical/Medical history reviewed Yes   Body Part(s)   Body Part(s) Knee   Presentation and Etiology   Pertinent history of current problem (include personal factors and/or comorbidities that impact the POC) On 10/25/2021 patient had a walk in cooler metal door fall onto his right tibial tubercle slide down his tibia shin bone and then landed on his foot.  He has since had patellar tendon insertion pain as well as some foot stiffness.  Most recently his foot has greatly improved no longer cause any problems for him but his tibial tubercle and patellar tendon have been fairly painful and limiting to him.  Patient complains that for is a localized pain 8 is the highest pain is sharp at times especially when walking lifting carrying and bending as well as doing some work tasks.  He has some work restrictions placed by the MD including no bending squatting or lifting greater than 40 pounds.  There are stairs into the basement at his work with which she does have to carry items up and down and sometimes that is also bothersome.  He has improved pain with sitting and resting his leg in a straight position as well as taking meloxicam.  Patient works at [employer] as well as [employer].  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  Hobbies include juggling outdoor activities and with friends and video and board games.   Current / Previous Interventions   Diagnostic Tests: X-ray   X-ray  Results unremarkable   Fall Risk Screen   Fall screen completed by PT   Is patient a fall risk? No   Abuse Screen (yes response referral indicated)   Feels Unsafe at Home or Work/School no   Feels Threatened by Someone no   Does Anyone Try to Keep You From Having Contact with Others or Doing Things Outside Your Home? no   Physical Signs of Abuse Present no   Knee Objective Findings   Side (if bilateral, select both right and left) Right   Observation mild increased volume in patellar tendon R>L   Gait/Locomotion normal gait   Balance/Proprioception (Single Leg Stance) good single leg balance   Knee ROM Comment 5 deg less knee flexion R compared to L when comparing quad length in prone   Knee/Hip Strength Comments strong throughout.   Lachmans Test -   Anterior Drawer Test -   Posterior Drawer Test -   Varus Stress Test -   Valgus Stress Test -   Anton's Test -   Apley's Test -   External Rotation Test -   Apprehension Test -   Palpation god patellar mobility, some crepitits palpaable in tendon cmopression and patellar glides.    Right Knee Extension AROM full   Right Knee Extension PROM full   Right Knee Flexion AROM WNL   Right Knee Flexion PROM WNL   Right Knee Flexion Strength 5   Right Knee Extension Strength 5   Right Hip Abduction Strength 4+   Right Quad Set Strength 5   R VMO Strength 5   Right Gastrocnemius Flexibility good   Right Hamstring Flexibility good   Right Hip Flexor Flexibility good   Right Quadricep Flexibility good, very mild tight right   Right ITB Flexibility good   Planned Therapy Interventions   Planned Therapy Interventions manual therapy;neuromuscular re-education;ROM;strengthening;stretching   Planned Modality Interventions   Planned Modality Interventions Cryotherapy;Electrical stimulation;Hot packs;Ultrasound;Iontophoresis   Planned Modality Interventions Comments ionto will use 4% Dexamethazone.    Clinical Impression   Criteria for Skilled Therapeutic Interventions Met yes,  treatment indicated   PT Diagnosis likely contusion of insertion of patellar tendon. now chronic in nature inflamation.   Influenced by the following impairments pain   Functional limitations due to impairments squat, bend knees deep, kneel, lift, stairs   Clinical Presentation Stable/Uncomplicated   Clinical Decision Making (Complexity) Low complexity   Predicted Duration of Therapy Intervention (days/wks) 1-2x/wk 4-8 weeks pending progress   Risk & Benefits of therapy have been explained Yes   Patient, Family & other staff in agreement with plan of care Yes   Clinical Impression Comments getting inflamation down will help reduce pain.    ORTHO GOALS   PT Ortho Eval Goals 1;2;3   Ortho Goal 1   Goal Identifier Pain   Goal Description Patient will have no pain when performing duties at [employer] and at least 75% reduction in pain when performing duties at [employer]   Target Date 01/28/22   Ortho Goal 2   Goal Identifier Home exercise program   Goal Description Patient will maintain home exercise program at least 3 times a week to improve pain   Target Date 01/28/22   Ortho Goal 3   Goal Identifier Stairs   Goal Description He will perform stairs with no limitations while carrying items for work tasks   Target Date 01/28/22   Total Evaluation Time   PT Shane Low Complexity Minutes (07965) 25   Therapy Certification   Certification date from 12/22/21   Certification date to 02/25/22   Medical Diagnosis Encounter related to worker's compensation claim Z02.6 Patellar tendinitis of right knee M76.51 Contusion, knee and lower leg, right, subsequent encounter S80.01XD, S80.11XD

## 2021-12-22 NOTE — PROGRESS NOTES
[facility]    OUTPATIENT PHYSICAL THERAPY ORTHOPEDIC EVALUATION  PLAN OF TREATMENT FOR OUTPATIENT REHABILITATION  (COMPLETE FOR INITIAL CLAIMS ONLY)  Patient's Last Name, First Name, M.I.  YOB: 2000  Slim Bueno    Provider s Name:  [facility]   Medical Record No.  [id number]   Start of Care Date:  12/22/21   Onset Date:   10/25/21   Type:     _X__PT   ___OT   ___SLP Medical Diagnosis:  Encounter related to worker's compensation claim Z02.6 Patellar tendinitis of right knee M76.51 Contusion, knee and lower leg, right, subsequent encounter S80.01XD, S80.11XD      PT Diagnosis:  likely contusion of insertion of patellar tendon. now chronic in nature inflamation.   Visits from SOC:  1      _________________________________________________________________________________  Plan of Treatment/Functional Goals:  manual therapy,neuromuscular re-education,ROM,strengthening,stretching     Cryotherapy,Electrical stimulation,Hot packs,Ultrasound,Iontophoresis  ionto will use 4% Dexamethazone.     Goals  Goal Identifier: Pain  Goal Description: Patient will have no pain when performing duties at [employer] and at least 75% reduction in pain when performing duties at [facility]  Target Date: 01/28/22    Goal Identifier: Home exercise program  Goal Description: Patient will maintain home exercise program at least 3 times a week to improve pain  Target Date: 01/28/22    Goal Identifier: Stairs  Goal Description: He will perform stairs with no limitations while carrying items for work tasks  Target Date: 01/28/22              Therapy Frequency:     Predicted Duration of Therapy Intervention:  1-2x/wk 4-8 weeks pending progress    Michael Do, PT                 I CERTIFY THE NEED FOR THESE SERVICES FURNISHED UNDER        THIS PLAN OF TREATMENT AND WHILE UNDER MY CARE      (Physician co-signature of this document indicates review and certification of the therapy plan).                       Certification Date From:  12/22/21   Certification Date To:  02/25/22    Referring Provider:  Martine    Initial Assessment        See Epic Evaluation Start of Care Date: 12/22/21

## 2021-12-28 ENCOUNTER — HOSPITAL ENCOUNTER (OUTPATIENT)
Dept: PHYSICAL THERAPY | Facility: OTHER | Age: 21
Setting detail: THERAPIES SERIES
End: 2021-12-28
Attending: FAMILY MEDICINE
Payer: OTHER MISCELLANEOUS

## 2021-12-28 PROCEDURE — 97110 THERAPEUTIC EXERCISES: CPT | Mod: GP

## 2021-12-28 PROCEDURE — 97035 APP MDLTY 1+ULTRASOUND EA 15: CPT | Mod: GP

## 2021-12-29 ENCOUNTER — OFFICE VISIT (OUTPATIENT)
Dept: FAMILY MEDICINE | Facility: OTHER | Age: 21
End: 2021-12-29
Attending: FAMILY MEDICINE
Payer: OTHER MISCELLANEOUS

## 2021-12-29 VITALS
OXYGEN SATURATION: 97 % | HEART RATE: 88 BPM | DIASTOLIC BLOOD PRESSURE: 62 MMHG | TEMPERATURE: 97 F | RESPIRATION RATE: 16 BRPM | SYSTOLIC BLOOD PRESSURE: 102 MMHG

## 2021-12-29 DIAGNOSIS — S80.01XD CONTUSION, KNEE AND LOWER LEG, RIGHT, SUBSEQUENT ENCOUNTER: ICD-10-CM

## 2021-12-29 DIAGNOSIS — M76.51 PATELLAR TENDINITIS OF RIGHT KNEE: ICD-10-CM

## 2021-12-29 DIAGNOSIS — S80.11XD CONTUSION, KNEE AND LOWER LEG, RIGHT, SUBSEQUENT ENCOUNTER: ICD-10-CM

## 2021-12-29 DIAGNOSIS — Z02.6 ENCOUNTER RELATED TO WORKER'S COMPENSATION CLAIM: ICD-10-CM

## 2021-12-29 PROCEDURE — 99213 OFFICE O/P EST LOW 20 MIN: CPT | Performed by: FAMILY MEDICINE

## 2021-12-29 RX ORDER — MELOXICAM 15 MG/1
15 TABLET ORAL DAILY
Qty: 30 TABLET | Refills: 0 | Status: SHIPPED | OUTPATIENT
Start: 2021-12-29 | End: 2022-02-07

## 2021-12-29 ASSESSMENT — PAIN SCALES - GENERAL: PAINLEVEL: MILD PAIN (3)

## 2021-12-29 NOTE — PROGRESS NOTES
"Nursing Notes:   Ayanna Sellers LPN  12/29/2021  9:16 AM  Sign at exiting of workspace  Patient presents to the clinic for work comp follow up.    FOOD SECURITY SCREENING QUESTIONS:    The next two questions are to help us understand your food security.  If you are feeling you need any assistance in this area, we have resources available to support you today.    Hunger Vital Signs:  Within the past 12 months we worried whether our food would run out before we got money to buy more. Never  Within the past 12 months the food we bought just didn't last and we didn't have money to get more. Never    Chief Complaint   Patient presents with     Work Comp       Initial /62 (BP Location: Right arm, Patient Position: Sitting, Cuff Size: Adult Regular)   Pulse 88   Temp 97  F (36.1  C) (Tympanic)   Resp 16   SpO2 97%  Estimated body mass index is 19.84 kg/m  as calculated from the following:    Height as of 10/27/21: 1.67 m (5' 5.75\").    Weight as of 12/13/21: 55.3 kg (122 lb).  Medication Reconciliation: complete        Ayanna Sellers LPN         Assessment & Plan       ICD-10-CM    1. Encounter related to worker's compensation claim  Z02.6 meloxicam (MOBIC) 15 MG tablet   2. Patellar tendinitis of right knee  M76.51 meloxicam (MOBIC) 15 MG tablet   3. Contusion, knee and lower leg, right, subsequent encounter  S80.01XD meloxicam (MOBIC) 15 MG tablet    S80.11XD      Main area of discomfort appears to be patellar tendinitis.  Still no concerning features on exam.   He is tolerating reduced work restrictions.  Today, eliminated restrictions on lifting, carrying, standing.  Changed from complete avoidance of kneeling and squatting to significantly limit kneeling and squatting.  Continue with meloxicam for another month.  If he is tolerating the new restrictions at an appointment in 2 weeks, plan to remove all restrictions.    Carlos Farley MD     [facility] AND [facility]      SUBJECTIVE:  21 year old " "male presents to follow up on a work comp right leg injury from 10/26/21 at [facility].     Notes from Dr Espitia:  \"History: Large metal cooler door broke off of a cracked hinge, and fell down.  He had attempted to help move another co-worker that was in the way, and the top of the door hit just below his R knee and slid down his shin and landed on the top of his R foot.\"     He was off 7 days of work. Tried to go back and had more pain. Could barely work after standing for a while.  Pain at the knee and foot became worse when he tried to squat down to pick something up.  Therefore, he was placed on light duty.  When he tried to increase work duties, he had some worsened pain.    Placed back on meloxicam a month ago, which has helped.   Started PT  \"Leg feels better\"  Still has tenderness at the patellar tendon  May have a little flare with work, but tolerating current work duties      REVIEW OF SYSTEMS:    Pertinent items are noted in HPI.    Current Outpatient Medications   Medication Sig Dispense Refill     albuterol (PROAIR HFA/PROVENTIL HFA/VENTOLIN HFA) 108 (90 Base) MCG/ACT inhaler Inhale 2 puffs into the lungs every 6 hours as needed for shortness of breath / dyspnea or wheezing 8.5 g 0     fluticasone (FLONASE) 50 MCG/ACT nasal spray Spray 1 spray into both nostrils daily 16 g 1     ketoconazole (NIZORAL) 2 % external shampoo Apply topically daily as needed for itching or irritation (of scalp/hair) 360 mL 2     meclizine (ANTIVERT) 12.5 MG tablet Take 4 tablets (50 mg) by mouth 4 times daily as needed for dizziness 30 tablet 0     meclizine (ANTIVERT) 25 MG tablet Take 1 tablet (25 mg) by mouth 3 times daily as needed for dizziness 90 tablet 1     meloxicam (MOBIC) 15 MG tablet Take 1 tablet (15 mg) by mouth daily 30 tablet 0     phenol (CHLORASEPTIC) 1.4 % spray Take 1 spray (1 mL) by mouth every hour as needed for sore throat 177 mL 1     No Known Allergies    OBJECTIVE:  /62 (BP Location: Right " arm, Patient Position: Sitting, Cuff Size: Adult Regular)   Pulse 88   Temp 97  F (36.1  C) (Tympanic)   Resp 16   SpO2 97%     EXAM:  Musculoskeletal:He has some mild tenderness anteriorly over the right patellar tendon, not over remaining knee

## 2021-12-29 NOTE — NURSING NOTE
"Patient presents to the clinic for work comp follow up.    FOOD SECURITY SCREENING QUESTIONS:    The next two questions are to help us understand your food security.  If you are feeling you need any assistance in this area, we have resources available to support you today.    Hunger Vital Signs:  Within the past 12 months we worried whether our food would run out before we got money to buy more. Never  Within the past 12 months the food we bought just didn't last and we didn't have money to get more. Never    Chief Complaint   Patient presents with     Work Comp       Initial /62 (BP Location: Right arm, Patient Position: Sitting, Cuff Size: Adult Regular)   Pulse 88   Temp 97  F (36.1  C) (Tympanic)   Resp 16   SpO2 97%  Estimated body mass index is 19.84 kg/m  as calculated from the following:    Height as of 10/27/21: 1.67 m (5' 5.75\").    Weight as of 12/13/21: 55.3 kg (122 lb).  Medication Reconciliation: complete        Ayanna Sellers LPN    "

## 2021-12-30 ENCOUNTER — HOSPITAL ENCOUNTER (OUTPATIENT)
Dept: PHYSICAL THERAPY | Facility: OTHER | Age: 21
Setting detail: THERAPIES SERIES
End: 2021-12-30
Attending: FAMILY MEDICINE
Payer: OTHER MISCELLANEOUS

## 2021-12-30 PROCEDURE — 97035 APP MDLTY 1+ULTRASOUND EA 15: CPT | Mod: GP

## 2021-12-30 PROCEDURE — 97110 THERAPEUTIC EXERCISES: CPT | Mod: GP

## 2022-01-04 ENCOUNTER — HOSPITAL ENCOUNTER (OUTPATIENT)
Dept: PHYSICAL THERAPY | Facility: OTHER | Age: 22
Setting detail: THERAPIES SERIES
End: 2022-01-04
Attending: FAMILY MEDICINE
Payer: OTHER MISCELLANEOUS

## 2022-01-04 PROCEDURE — 97035 APP MDLTY 1+ULTRASOUND EA 15: CPT | Mod: GP | Performed by: PHYSICAL THERAPIST

## 2022-01-04 PROCEDURE — 97110 THERAPEUTIC EXERCISES: CPT | Mod: GP | Performed by: PHYSICAL THERAPIST

## 2022-01-06 ENCOUNTER — OFFICE VISIT (OUTPATIENT)
Dept: FAMILY MEDICINE | Facility: OTHER | Age: 22
End: 2022-01-06
Attending: PHYSICIAN ASSISTANT
Payer: COMMERCIAL

## 2022-01-06 VITALS
WEIGHT: 121.5 LBS | TEMPERATURE: 98 F | OXYGEN SATURATION: 99 % | HEART RATE: 84 BPM | DIASTOLIC BLOOD PRESSURE: 66 MMHG | BODY MASS INDEX: 19.76 KG/M2 | RESPIRATION RATE: 8 BRPM | SYSTOLIC BLOOD PRESSURE: 100 MMHG

## 2022-01-06 DIAGNOSIS — R50.9 FEVER, UNSPECIFIED FEVER CAUSE: Primary | ICD-10-CM

## 2022-01-06 PROCEDURE — C9803 HOPD COVID-19 SPEC COLLECT: HCPCS

## 2022-01-06 PROCEDURE — G0463 HOSPITAL OUTPT CLINIC VISIT: HCPCS

## 2022-01-06 PROCEDURE — U0005 INFEC AGEN DETEC AMPLI PROBE: HCPCS | Mod: ZL | Performed by: PHYSICIAN ASSISTANT

## 2022-01-06 PROCEDURE — 99213 OFFICE O/P EST LOW 20 MIN: CPT | Performed by: PHYSICIAN ASSISTANT

## 2022-01-06 ASSESSMENT — PAIN SCALES - GENERAL: PAINLEVEL: NO PAIN (0)

## 2022-01-06 NOTE — LETTER
[facility]  [address]  [place] [address]  Phone: [phone]  Fax: [phone]    January 6, 2022        Slim Bueno     [facility] [address]          To whom it may concern:    RE: Slim Bueno    Patient was seen and treated today at our clinic and missed work.    COVID test in process.      If COVID negative, OK to return to work as long as fever free without use of antipyretics x 24 hours.     If COVID positive, out for 10 days from symptom onset.     Please contact me for questions or concerns.      Sincerely,        Terrie Palencia PA-C
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
## 2022-01-06 NOTE — NURSING NOTE
"Chief Complaint   Patient presents with     Cough     fever. Exposed to influenza A     Had negative at home covid test yesterday.     Initial /66 (BP Location: Left arm, Patient Position: Sitting, Cuff Size: Adult Regular)   Pulse 84   Temp 98  F (36.7  C) (Tympanic)   Resp 8   Wt 55.1 kg (121 lb 8 oz)   SpO2 99%   BMI 19.76 kg/m   Estimated body mass index is 19.76 kg/m  as calculated from the following:    Height as of 10/27/21: 1.67 m (5' 5.75\").    Weight as of this encounter: 55.1 kg (121 lb 8 oz).  Medication Reconciliation: Completed     Advanced Care Directive Reviewed    Tien Parada LPN  "

## 2022-01-08 LAB — SARS-COV-2 RNA RESP QL NAA+PROBE: POSITIVE

## 2022-02-02 ENCOUNTER — HOSPITAL ENCOUNTER (OUTPATIENT)
Dept: PHYSICAL THERAPY | Facility: OTHER | Age: 22
Setting detail: THERAPIES SERIES
End: 2022-02-02
Attending: FAMILY MEDICINE
Payer: OTHER MISCELLANEOUS

## 2022-02-02 PROCEDURE — 97110 THERAPEUTIC EXERCISES: CPT | Mod: GP | Performed by: PHYSICAL THERAPIST
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
## 2022-02-04 ENCOUNTER — OFFICE VISIT (OUTPATIENT)
Dept: FAMILY MEDICINE | Facility: OTHER | Age: 22
End: 2022-02-04
Attending: PHYSICIAN ASSISTANT
Payer: COMMERCIAL

## 2022-02-04 VITALS
BODY MASS INDEX: 20.06 KG/M2 | WEIGHT: 124.8 LBS | SYSTOLIC BLOOD PRESSURE: 108 MMHG | HEIGHT: 66 IN | TEMPERATURE: 97.2 F | DIASTOLIC BLOOD PRESSURE: 62 MMHG | HEART RATE: 91 BPM | OXYGEN SATURATION: 98 % | RESPIRATION RATE: 14 BRPM

## 2022-02-04 DIAGNOSIS — R42 DIZZINESS: ICD-10-CM

## 2022-02-04 DIAGNOSIS — U07.1 INFECTION DUE TO 2019 NOVEL CORONAVIRUS: Primary | ICD-10-CM

## 2022-02-04 PROCEDURE — 99213 OFFICE O/P EST LOW 20 MIN: CPT | Performed by: PHYSICIAN ASSISTANT

## 2022-02-04 PROCEDURE — G0463 HOSPITAL OUTPT CLINIC VISIT: HCPCS

## 2022-02-04 ASSESSMENT — MIFFLIN-ST. JEOR: SCORE: 1503.84

## 2022-02-04 ASSESSMENT — PAIN SCALES - GENERAL: PAINLEVEL: NO PAIN (1)

## 2022-02-04 ASSESSMENT — PATIENT HEALTH QUESTIONNAIRE - PHQ9
SUM OF ALL RESPONSES TO PHQ QUESTIONS 1-9: 5
10. IF YOU CHECKED OFF ANY PROBLEMS, HOW DIFFICULT HAVE THESE PROBLEMS MADE IT FOR YOU TO DO YOUR WORK, TAKE CARE OF THINGS AT HOME, OR GET ALONG WITH OTHER PEOPLE: NOT DIFFICULT AT ALL
SUM OF ALL RESPONSES TO PHQ QUESTIONS 1-9: 5

## 2022-02-04 NOTE — NURSING NOTE
Patient presents to clinic with dizziness, and vertigo. Covid Positive on 1/6/2022.  Adeline Mahajan LPN ....................  2/4/2022   8:53 AM

## 2022-02-05 ASSESSMENT — PATIENT HEALTH QUESTIONNAIRE - PHQ9: SUM OF ALL RESPONSES TO PHQ QUESTIONS 1-9: 5

## 2022-02-07 ENCOUNTER — OFFICE VISIT (OUTPATIENT)
Dept: FAMILY MEDICINE | Facility: OTHER | Age: 22
End: 2022-02-07
Attending: FAMILY MEDICINE
Payer: OTHER MISCELLANEOUS

## 2022-02-07 VITALS
OXYGEN SATURATION: 97 % | TEMPERATURE: 97 F | HEART RATE: 76 BPM | SYSTOLIC BLOOD PRESSURE: 116 MMHG | DIASTOLIC BLOOD PRESSURE: 70 MMHG | RESPIRATION RATE: 20 BRPM

## 2022-02-07 DIAGNOSIS — M76.51 PATELLAR TENDINITIS OF RIGHT KNEE: ICD-10-CM

## 2022-02-07 DIAGNOSIS — Z02.6 ENCOUNTER RELATED TO WORKER'S COMPENSATION CLAIM: Primary | ICD-10-CM

## 2022-02-07 PROCEDURE — 99213 OFFICE O/P EST LOW 20 MIN: CPT | Performed by: FAMILY MEDICINE

## 2022-02-07 ASSESSMENT — PAIN SCALES - GENERAL: PAINLEVEL: NO PAIN (0)

## 2022-02-07 NOTE — NURSING NOTE
"Patient presents to the clinic for follow up with work comp.    FOOD SECURITY SCREENING QUESTIONS:    The next two questions are to help us understand your food security.  If you are feeling you need any assistance in this area, we have resources available to support you today.    Hunger Vital Signs:  Within the past 12 months we worried whether our food would run out before we got money to buy more. Never  Within the past 12 months the food we bought just didn't last and we didn't have money to get more. Never    Advance Care Directive on file? no  Advance Care Directive provided to patient? Declined.  Chief Complaint   Patient presents with     Work Comp       Initial /70 (BP Location: Right arm, Patient Position: Sitting, Cuff Size: Adult Regular)   Pulse 76   Temp 97  F (36.1  C) (Tympanic)   Resp 20   SpO2 97%  Estimated body mass index is 20.14 kg/m  as calculated from the following:    Height as of 2/4/22: 1.676 m (5' 6\").    Weight as of 2/4/22: 56.6 kg (124 lb 12.8 oz).  Medication Reconciliation: complete        Ayanna Sellers LPN       "

## 2022-02-07 NOTE — LETTER
February 7, 2022      Slim Bueno  [address]   [facility] [address]        To Whom It May Concern:    Slim Bueno was seen in our clinic. He may return to work without restrictions.      Sincerely,        Carlos Farley MD

## 2022-02-07 NOTE — PROGRESS NOTES
"  Assessment & Plan       ICD-10-CM    1. Encounter related to worker's compensation claim  Z02.6    2. Patellar tendinitis of right knee  M76.51        Pain is resolved.  Physical therapy was very beneficial.  He feels able to resume full work duties.  Given work comp form clearing for full work duty.  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  If he has flareup of symptoms, encouraged brief use of meloxicam to help.  For ongoing problems, return for evaluation.  At this point appears to be at MMI.    Follow up as needed     Carlos Farley MD     [facility]      Nursing Notes:   Ayanna Sellers LPN  2/7/2022  9:45 AM  Signed  Patient presents to the clinic for follow up with work comp.    FOOD SECURITY SCREENING QUESTIONS:    The next two questions are to help us understand your food security.  If you are feeling you need any assistance in this area, we have resources available to support you today.    Hunger Vital Signs:  Within the past 12 months we worried whether our food would run out before we got money to buy more. Never  Within the past 12 months the food we bought just didn't last and we didn't have money to get more. Never    Advance Care Directive on file? no  Advance Care Directive provided to patient? Declined.  Chief Complaint   Patient presents with     Work Comp       Initial /70 (BP Location: Right arm, Patient Position: Sitting, Cuff Size: Adult Regular)   Pulse 76   Temp 97  F (36.1  C) (Tympanic)   Resp 20   SpO2 97%  Estimated body mass index is 20.14 kg/m  as calculated from the following:    Height as of 2/4/22: 1.676 m (5' 6\").    Weight as of 2/4/22: 56.6 kg (124 lb 12.8 oz).  Medication Reconciliation: complete        Ayanna Sellers LPN              SUBJECTIVE:  HPI    21 year old male presents for follow up on a work comp right leg injury from 10/26/21 at [facility].     Notes from Dr" "Nupur:  \"History: Large metal cooler door broke off of a cracked hinge, and fell down.  He had attempted to help move another co-worker that was in the way, and the top of the door hit just below his R knee and slid down his shin and landed on the top of his R foot.\"    He was initially off work for 7 days and had a flare of pain when he tried to return.  Restrictions have slowly been eased over time.  Had some benefit with meloxicam, but has stopped utilizing it  Physical therapy has been helpful.  Pain resolved.  Leg \"barely inflammed now.\"  Full ROM without pain      REVIEW OF SYSTEMS:    Pertinent items are noted in HPI.    Current Outpatient Medications   Medication Sig Dispense Refill     albuterol (PROAIR HFA/PROVENTIL HFA/VENTOLIN HFA) 108 (90 Base) MCG/ACT inhaler Inhale 2 puffs into the lungs every 6 hours as needed for shortness of breath / dyspnea or wheezing 8.5 g 0     fluticasone (FLONASE) 50 MCG/ACT nasal spray Spray 1 spray into both nostrils daily 16 g 1     ketoconazole (NIZORAL) 2 % external shampoo Apply topically daily as needed for itching or irritation (of scalp/hair) 360 mL 2     meclizine (ANTIVERT) 25 MG tablet Take 1 tablet (25 mg) by mouth 3 times daily as needed for dizziness 90 tablet 1     phenol (CHLORASEPTIC) 1.4 % spray Take 1 spray (1 mL) by mouth every hour as needed for sore throat 177 mL 1     No Known Allergies    OBJECTIVE:  /70 (BP Location: Right arm, Patient Position: Sitting, Cuff Size: Adult Regular)   Pulse 76   Temp 97  F (36.1  C) (Tympanic)   Resp 20   SpO2 97%     EXAM:  General Appearance: Alert. No acute distress  Psychiatric: Normal affect and mentation  Musculoskeletal: Right knee without swelling.  No tenderness to palpation.  Full range of motion.   "

## 2022-02-14 ENCOUNTER — APPOINTMENT (OUTPATIENT)
Dept: CT IMAGING | Facility: OTHER | Age: 22
End: 2022-02-14
Attending: FAMILY MEDICINE

## 2022-02-14 ENCOUNTER — HOSPITAL ENCOUNTER (EMERGENCY)
Facility: OTHER | Age: 22
Discharge: HOME | End: 2022-02-15
Attending: FAMILY MEDICINE | Admitting: FAMILY MEDICINE

## 2022-02-14 DIAGNOSIS — S06.0X0A CONCUSSION WITHOUT LOSS OF CONSCIOUSNESS, INITIAL ENCOUNTER: ICD-10-CM
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
## 2022-02-14 DIAGNOSIS — I60.9 SUBARACHNOID HEMORRHAGE (H): ICD-10-CM

## 2022-02-14 PROCEDURE — 72125 CT NECK SPINE W/O DYE: CPT | Mod: TC

## 2022-02-14 PROCEDURE — 99284 EMERGENCY DEPT VISIT MOD MDM: CPT | Mod: 25 | Performed by: FAMILY MEDICINE

## 2022-02-14 PROCEDURE — 99284 EMERGENCY DEPT VISIT MOD MDM: CPT | Performed by: FAMILY MEDICINE

## 2022-02-14 PROCEDURE — 70450 CT HEAD/BRAIN W/O DYE: CPT | Mod: TC

## 2022-02-14 NOTE — Clinical Note
Slim Bueno was seen and treated in our emergency department on 2/14/2022.  He may return to work on 02/18/2022.  Off work secondary to head injury/ Should not do any heavy lifting until he is -re-evaluated in clinic.      If you have any questions or concerns, please don't hesitate to call.      Trini Johnson MD

## 2022-02-15 ENCOUNTER — APPOINTMENT (OUTPATIENT)
Dept: CT IMAGING | Facility: OTHER | Age: 22
End: 2022-02-15
Attending: FAMILY MEDICINE
Payer: COMMERCIAL

## 2022-02-15 ENCOUNTER — HOSPITAL ENCOUNTER (EMERGENCY)
Facility: OTHER | Age: 22
Discharge: HOME | End: 2022-02-15
Attending: FAMILY MEDICINE | Admitting: FAMILY MEDICINE
Payer: COMMERCIAL

## 2022-02-15 VITALS
SYSTOLIC BLOOD PRESSURE: 101 MMHG | DIASTOLIC BLOOD PRESSURE: 59 MMHG | HEART RATE: 79 BPM | BODY MASS INDEX: 20.18 KG/M2 | OXYGEN SATURATION: 97 % | RESPIRATION RATE: 16 BRPM | TEMPERATURE: 98.1 F | WEIGHT: 125 LBS

## 2022-02-15 VITALS
HEIGHT: 66 IN | BODY MASS INDEX: 20.09 KG/M2 | RESPIRATION RATE: 20 BRPM | DIASTOLIC BLOOD PRESSURE: 51 MMHG | OXYGEN SATURATION: 97 % | WEIGHT: 125 LBS | SYSTOLIC BLOOD PRESSURE: 92 MMHG | HEART RATE: 55 BPM | TEMPERATURE: 98 F

## 2022-02-15 DIAGNOSIS — R51.9 ACUTE INTRACTABLE HEADACHE, UNSPECIFIED HEADACHE TYPE: ICD-10-CM

## 2022-02-15 DIAGNOSIS — S09.90XD HEAD TRAUMA, SUBSEQUENT ENCOUNTER: ICD-10-CM

## 2022-02-15 LAB
ALBUMIN SERPL-MCNC: 4.8 G/DL (ref 3.5–5.7)
ALP SERPL-CCNC: 56 U/L (ref 34–104)
ALT SERPL W P-5'-P-CCNC: 13 U/L (ref 7–52)
ANION GAP SERPL CALCULATED.3IONS-SCNC: 8 MMOL/L (ref 3–14)
AST SERPL W P-5'-P-CCNC: 15 U/L (ref 13–39)
BASOPHILS # BLD AUTO: 0.1 10E3/UL (ref 0–0.2)
BASOPHILS NFR BLD AUTO: 1 %
BILIRUB SERPL-MCNC: 0.5 MG/DL (ref 0.3–1)
BUN SERPL-MCNC: 13 MG/DL (ref 7–25)
CALCIUM SERPL-MCNC: 9.6 MG/DL (ref 8.6–10.3)
CHLORIDE BLD-SCNC: 101 MMOL/L (ref 98–107)
CO2 SERPL-SCNC: 34 MMOL/L (ref 21–31)
CREAT SERPL-MCNC: 1.01 MG/DL (ref 0.7–1.3)
EOSINOPHIL # BLD AUTO: 0.1 10E3/UL (ref 0–0.7)
EOSINOPHIL NFR BLD AUTO: 1 %
ERYTHROCYTE [DISTWIDTH] IN BLOOD BY AUTOMATED COUNT: 12 % (ref 10–15)
GFR SERPL CREATININE-BSD FRML MDRD: >90 ML/MIN/1.73M2
GLUCOSE BLD-MCNC: 82 MG/DL (ref 70–105)
HCT VFR BLD AUTO: 46.8 % (ref 40–53)
HGB BLD-MCNC: 15.5 G/DL (ref 13.3–17.7)
IMM GRANULOCYTES # BLD: 0 10E3/UL
IMM GRANULOCYTES NFR BLD: 0 %
LYMPHOCYTES # BLD AUTO: 2.1 10E3/UL (ref 0.8–5.3)
LYMPHOCYTES NFR BLD AUTO: 32 %
MCH RBC QN AUTO: 28.9 PG (ref 26.5–33)
MCHC RBC AUTO-ENTMCNC: 33.1 G/DL (ref 31.5–36.5)
MCV RBC AUTO: 87 FL (ref 78–100)
MONOCYTES # BLD AUTO: 0.4 10E3/UL (ref 0–1.3)
MONOCYTES NFR BLD AUTO: 6 %
NEUTROPHILS # BLD AUTO: 4 10E3/UL (ref 1.6–8.3)
NEUTROPHILS NFR BLD AUTO: 60 %
NRBC # BLD AUTO: 0 10E3/UL
NRBC BLD AUTO-RTO: 0 /100
PLATELET # BLD AUTO: 318 10E3/UL (ref 150–450)
POTASSIUM BLD-SCNC: 3.9 MMOL/L (ref 3.5–5.1)
PROT SERPL-MCNC: 7.1 G/DL (ref 6.4–8.9)
RBC # BLD AUTO: 5.36 10E6/UL (ref 4.4–5.9)
SODIUM SERPL-SCNC: 143 MMOL/L (ref 134–144)
WBC # BLD AUTO: 6.7 10E3/UL (ref 4–11)

## 2022-02-15 PROCEDURE — 99283 EMERGENCY DEPT VISIT LOW MDM: CPT | Performed by: FAMILY MEDICINE

## 2022-02-15 PROCEDURE — 258N000003 HC RX IP 258 OP 636: Performed by: FAMILY MEDICINE

## 2022-02-15 PROCEDURE — 96361 HYDRATE IV INFUSION ADD-ON: CPT | Performed by: FAMILY MEDICINE

## 2022-02-15 PROCEDURE — 96375 TX/PRO/DX INJ NEW DRUG ADDON: CPT | Performed by: FAMILY MEDICINE

## 2022-02-15 PROCEDURE — 250N000011 HC RX IP 250 OP 636: Performed by: FAMILY MEDICINE

## 2022-02-15 PROCEDURE — 80053 COMPREHEN METABOLIC PANEL: CPT | Performed by: FAMILY MEDICINE

## 2022-02-15 PROCEDURE — 99284 EMERGENCY DEPT VISIT MOD MDM: CPT | Mod: 25 | Performed by: FAMILY MEDICINE

## 2022-02-15 PROCEDURE — 85025 COMPLETE CBC W/AUTO DIFF WBC: CPT | Performed by: FAMILY MEDICINE

## 2022-02-15 PROCEDURE — 70450 CT HEAD/BRAIN W/O DYE: CPT

## 2022-02-15 PROCEDURE — 36415 COLL VENOUS BLD VENIPUNCTURE: CPT | Performed by: FAMILY MEDICINE

## 2022-02-15 PROCEDURE — 82040 ASSAY OF SERUM ALBUMIN: CPT | Performed by: FAMILY MEDICINE

## 2022-02-15 PROCEDURE — 96374 THER/PROPH/DIAG INJ IV PUSH: CPT | Performed by: FAMILY MEDICINE

## 2022-02-15 RX ORDER — DIPHENHYDRAMINE HYDROCHLORIDE 50 MG/ML
25 INJECTION INTRAMUSCULAR; INTRAVENOUS ONCE
Status: COMPLETED | OUTPATIENT
Start: 2022-02-15 | End: 2022-02-15

## 2022-02-15 RX ORDER — SODIUM CHLORIDE 9 MG/ML
INJECTION, SOLUTION INTRAVENOUS CONTINUOUS
Status: DISCONTINUED | OUTPATIENT
Start: 2022-02-15 | End: 2022-02-15 | Stop reason: HOSPADM

## 2022-02-15 RX ADMIN — DIPHENHYDRAMINE HYDROCHLORIDE 25 MG: 50 INJECTION, SOLUTION INTRAMUSCULAR; INTRAVENOUS at 19:23

## 2022-02-15 RX ADMIN — SODIUM CHLORIDE 1000 ML: 9 INJECTION, SOLUTION INTRAVENOUS at 19:22

## 2022-02-15 RX ADMIN — PROCHLORPERAZINE EDISYLATE 10 MG: 5 INJECTION INTRAMUSCULAR; INTRAVENOUS at 19:23

## 2022-02-15 ASSESSMENT — MIFFLIN-ST. JEOR: SCORE: 1504.75

## 2022-02-15 ASSESSMENT — ENCOUNTER SYMPTOMS
EYE PAIN: 1
HEADACHES: 1
SPEECH DIFFICULTY: 0
EYE REDNESS: 0
PALPITATIONS: 0
SHORTNESS OF BREATH: 0
COLOR CHANGE: 0
WEAKNESS: 0
DIZZINESS: 1
SHORTNESS OF BREATH: 0
APPETITE CHANGE: 0
CONFUSION: 0
NECK STIFFNESS: 0
ARTHRALGIAS: 0
ABDOMINAL PAIN: 0
DIFFICULTY URINATING: 0
HEADACHES: 1
ABDOMINAL PAIN: 0
FEVER: 0
CHEST TIGHTNESS: 0
ACTIVITY CHANGE: 0
COUGH: 0
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
## 2022-02-15 NOTE — ED TRIAGE NOTES
"ED Nursing Triage Note (General)   ________________________________    Slim Bueno is a 21 year old Male that presents to triage private car  With history of falling yesterday at work and hit his head on the ground, was seen here in the ED and had a diagnosis of a subarachnoid hemorrhage and a concussion. Pt says it has been 24 hours and symptoms are still there. Pt says symptoms are not worse then last night but still there. Pt says he gets nauseated with walking and eyes hurt only when I am in a vehicle. Pt says he has a headache of 5/10 and has increased since yesterday.     /76   Pulse 85   Temp 98.8  F (37.1  C) (Tympanic)   Resp 20   Ht 1.676 m (5' 6\")   Wt 56.7 kg (125 lb)   SpO2 97%   BMI 20.18 kg/m  t  Patient appears alert  and oriented, in no acute distress., and cooperative, pleasant and calm behavior.  GCS Total = 15  Airway: intact  Breathing noted as Normal  Circulation Normal  Skin:  Normal  Action taken:  Triage to critical care immediately      PRE HOSPITAL PRIOR LIVING SITUATION Other:  family  "
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
## 2022-02-16 NOTE — DISCHARGE INSTRUCTIONS
Your head CT scan today did not show any sign of bleeding which is reassuring. Use tylenol and/or ibuprofen (always take with food to avoid stomach ulcers) to any return of your headache. Return to ER if you develop severe headache or uncontrollable nausea/vomiting or new weakness/numbness.

## 2022-02-17 ENCOUNTER — OFFICE VISIT (OUTPATIENT)
Dept: FAMILY MEDICINE | Facility: OTHER | Age: 22
End: 2022-02-17
Attending: PHYSICIAN ASSISTANT
Payer: COMMERCIAL

## 2022-02-17 VITALS
TEMPERATURE: 97.4 F | DIASTOLIC BLOOD PRESSURE: 52 MMHG | BODY MASS INDEX: 20.09 KG/M2 | HEART RATE: 115 BPM | HEIGHT: 66 IN | RESPIRATION RATE: 12 BRPM | SYSTOLIC BLOOD PRESSURE: 94 MMHG | OXYGEN SATURATION: 96 % | WEIGHT: 125 LBS

## 2022-02-17 DIAGNOSIS — S09.90XD TRAUMATIC INJURY OF HEAD, SUBSEQUENT ENCOUNTER: Primary | ICD-10-CM

## 2022-02-17 PROCEDURE — 99212 OFFICE O/P EST SF 10 MIN: CPT | Performed by: PHYSICIAN ASSISTANT

## 2022-02-17 PROCEDURE — G0463 HOSPITAL OUTPT CLINIC VISIT: HCPCS

## 2022-02-17 ASSESSMENT — PAIN SCALES - GENERAL: PAINLEVEL: MODERATE PAIN (5)

## 2022-02-17 NOTE — NURSING NOTE
Patient presents to clinic for ER follow up and return to work letter.  Adeline Mahajan LPN ....................  2/17/2022   4:15 PM
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
## 2022-02-18 ASSESSMENT — PATIENT HEALTH QUESTIONNAIRE - PHQ9: SUM OF ALL RESPONSES TO PHQ QUESTIONS 1-9: 5

## 2022-02-21 ENCOUNTER — MYC MEDICAL ADVICE (OUTPATIENT)
Dept: FAMILY MEDICINE | Facility: OTHER | Age: 22
End: 2022-02-21
Payer: COMMERCIAL

## 2022-03-09 ENCOUNTER — OFFICE VISIT (OUTPATIENT)
Dept: FAMILY MEDICINE | Facility: OTHER | Age: 22
End: 2022-03-09
Attending: NURSE PRACTITIONER
Payer: COMMERCIAL

## 2022-03-09 VITALS
WEIGHT: 127 LBS | OXYGEN SATURATION: 97 % | RESPIRATION RATE: 16 BRPM | TEMPERATURE: 97.4 F | BODY MASS INDEX: 20.5 KG/M2 | SYSTOLIC BLOOD PRESSURE: 130 MMHG | DIASTOLIC BLOOD PRESSURE: 60 MMHG | HEART RATE: 88 BPM

## 2022-03-09 DIAGNOSIS — J06.9 VIRAL URI WITH COUGH: Primary | ICD-10-CM

## 2022-03-09 PROCEDURE — 99213 OFFICE O/P EST LOW 20 MIN: CPT | Performed by: NURSE PRACTITIONER

## 2022-03-09 PROCEDURE — G0463 HOSPITAL OUTPT CLINIC VISIT: HCPCS

## 2022-03-09 ASSESSMENT — PATIENT HEALTH QUESTIONNAIRE - PHQ9
SUM OF ALL RESPONSES TO PHQ QUESTIONS 1-9: 7
10. IF YOU CHECKED OFF ANY PROBLEMS, HOW DIFFICULT HAVE THESE PROBLEMS MADE IT FOR YOU TO DO YOUR WORK, TAKE CARE OF THINGS AT HOME, OR GET ALONG WITH OTHER PEOPLE: NOT DIFFICULT AT ALL
SUM OF ALL RESPONSES TO PHQ QUESTIONS 1-9: 7

## 2022-03-09 ASSESSMENT — PAIN SCALES - GENERAL: PAINLEVEL: SEVERE PAIN (6)

## 2022-03-09 NOTE — PROGRESS NOTES
Assessment & Plan   Problem List Items Addressed This Visit     None      Visit Diagnoses     Viral URI with cough    -  Primary      Symptoms are consistent with viral upper respiratory, he has had symptoms for couple days and are already showing improvement.  Note was written for him to excuse him from his absence yesterday and today, he will return to work tomorrow.  Continue with symptomatic management and reviewed signs symptoms of warrant follow-up.    He did complete the questionnaire on the iPad for mental health follow-up although he is not being seen for this today.    No follow-ups on file.    DANIEL Cerrato [facility] AND [facility]    Myanor Smalls is a 21 year old who presents for the following health issues     URI    History of Present Illness       Mental Health Follow-up:                    Today's PHQ-9         PHQ-9 Total Score: 7  PHQ-9 Q9 Thoughts of better off dead/self-harm past 2 weeks :   (P) Not at all    How difficult have these problems made it for you to do your work, take care of things at home, or get along with other people: Not difficult at all        Reason for visit:  Sore throat, severe headache, coughing  Symptom onset:  1-3 days ago  Symptoms include:  Sore throat, severe headache, coughing  Symptom intensity:  Moderate  Symptom progression:  Improving  Had these symptoms before:  No  What makes it worse:  Standing up and walking/moving  What makes it better:  Laying down    He eats 0-1 servings of fruits and vegetables daily.He consumes 3 sweetened beverage(s) daily.He exercises with enough effort to increase his heart rate 30 to 60 minutes per day.  He exercises with enough effort to increase his heart rate 3 or less days per week.   He is taking medications regularly.     Has had 2 days of fever (now resolved) of 101, headache (severe). Headache worsened with movement. Has sore throat, congestion, cough as well. Not taking anything for sx. Had COVID  in January 2022.  One week ago delfina's child home with similar sx.       Review of Systems   Review of systems was obtained, pertinent positives and negatives are noted above      Objective    /60 (BP Location: Right arm, Patient Position: Sitting, Cuff Size: Adult Regular)   Pulse 88   Temp 97.4  F (36.3  C) (Tympanic)   Resp 16   Wt 57.6 kg (127 lb)   SpO2 97%   BMI 20.50 kg/m    Body mass index is 20.5 kg/m .  Physical Exam   GENERAL: healthy, alert and no distress  EYES: Eyes grossly normal to inspection, PERRL and conjunctivae and sclerae normal  HENT: ear canals and TM's normal, nose and mouth without ulcers or lesions  NECK: no adenopathy, no asymmetry, masses, or scars and thyroid normal to palpation  RESP: lungs clear to auscultation - no rales, rhonchi or wheezes  CV: regular rate and rhythm, normal S1 S2, no S3 or S4, no murmur, click or rub, no peripheral edema and peripheral pulses strong  PSYCH: mentation appears normal, affect normal/bright

## 2022-03-09 NOTE — NURSING NOTE
Chief Complaint   Patient presents with     URI     Cough/ Headache/ Sorethroat       Medication Reconciliation: complete   Does have   Recent brain hemorrhage one month ago.      Angely Davis LPN........................3/9/2022  9:25 AM

## 2022-03-09 NOTE — LETTER
March 9, 2022      Slim Bueno  [address]   [facility] [id number]        To Whom It May Concern:    Slim Bueno  was seen on 3/9/2022.  Please excuse him  until 3/10/2022 due to illness.        Sincerely,        DANIEL Cerrato CNP

## 2022-03-10 ASSESSMENT — PATIENT HEALTH QUESTIONNAIRE - PHQ9: SUM OF ALL RESPONSES TO PHQ QUESTIONS 1-9: 7

## 2022-03-13 ENCOUNTER — HOSPITAL ENCOUNTER (EMERGENCY)
Facility: OTHER | Age: 22
Discharge: HOME | End: 2022-03-13
Attending: STUDENT IN AN ORGANIZED HEALTH CARE EDUCATION/TRAINING PROGRAM | Admitting: STUDENT IN AN ORGANIZED HEALTH CARE EDUCATION/TRAINING PROGRAM
Payer: COMMERCIAL

## 2022-03-13 VITALS
HEIGHT: 66 IN | DIASTOLIC BLOOD PRESSURE: 85 MMHG | WEIGHT: 120 LBS | HEART RATE: 80 BPM | SYSTOLIC BLOOD PRESSURE: 122 MMHG | TEMPERATURE: 97.7 F | OXYGEN SATURATION: 98 % | RESPIRATION RATE: 16 BRPM | BODY MASS INDEX: 19.29 KG/M2

## 2022-03-13 DIAGNOSIS — H66.005 RECURRENT ACUTE SUPPURATIVE OTITIS MEDIA WITHOUT SPONTANEOUS RUPTURE OF LEFT TYMPANIC MEMBRANE: ICD-10-CM

## 2022-03-13 PROCEDURE — 99283 EMERGENCY DEPT VISIT LOW MDM: CPT | Performed by: STUDENT IN AN ORGANIZED HEALTH CARE EDUCATION/TRAINING PROGRAM

## 2022-03-13 PROCEDURE — 250N000013 HC RX MED GY IP 250 OP 250 PS 637: Performed by: STUDENT IN AN ORGANIZED HEALTH CARE EDUCATION/TRAINING PROGRAM

## 2022-03-13 RX ORDER — OXYCODONE HYDROCHLORIDE 5 MG/1
5 TABLET ORAL ONCE
Status: COMPLETED | OUTPATIENT
Start: 2022-03-13 | End: 2022-03-13

## 2022-03-13 RX ORDER — ACETAMINOPHEN 325 MG/1
650 TABLET ORAL ONCE
Status: COMPLETED | OUTPATIENT
Start: 2022-03-13 | End: 2022-03-13

## 2022-03-13 RX ORDER — OXYCODONE HYDROCHLORIDE 5 MG/1
5 TABLET ORAL EVERY 6 HOURS PRN
Qty: 4 TABLET | Refills: 0 | Status: SHIPPED | OUTPATIENT
Start: 2022-03-13 | End: 2022-03-16

## 2022-03-13 RX ADMIN — IBUPROFEN 600 MG: 200 TABLET, FILM COATED ORAL at 05:19

## 2022-03-13 RX ADMIN — ACETAMINOPHEN 650 MG: 325 TABLET, FILM COATED ORAL at 05:19

## 2022-03-13 RX ADMIN — OXYCODONE HYDROCHLORIDE 5 MG: 5 TABLET ORAL at 05:19
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
## 2022-03-13 NOTE — ED TRIAGE NOTES
"ED Nursing Triage Note (General)   ________________________________    Slim Bueno is a 21 year old Male that presents to triage private car  With history of  Left ear pain reported by patient   Significant symptoms had onset 4 hour(s) ago.  /85   Pulse 80   Temp 97.7  F (36.5  C) (Tympanic)   Resp 18   Ht 1.676 m (5' 6\")   Wt 54.4 kg (120 lb)   SpO2 98%   BMI 19.37 kg/m  t  Patient appears alert , in mild distress., and cooperative behavior.    GCS Total = 15  Airway: intact  Breathing noted as Normal  Circulation Normal  Skin:  Normal  Action taken:  Roomed to bay 8      PRE HOSPITAL PRIOR LIVING SITUATION Significant Other    "

## 2022-03-22 ENCOUNTER — HOSPITAL ENCOUNTER (OUTPATIENT)
Dept: CT IMAGING | Facility: OTHER | Age: 22
Discharge: HOME | End: 2022-03-22
Attending: PSYCHIATRY & NEUROLOGY | Admitting: PSYCHIATRY & NEUROLOGY
Payer: COMMERCIAL

## 2022-03-22 DIAGNOSIS — R42 VERTIGO: ICD-10-CM

## 2022-03-22 PROCEDURE — 250N000011 HC RX IP 250 OP 636: Performed by: PSYCHIATRY & NEUROLOGY

## 2022-03-22 PROCEDURE — 70496 CT ANGIOGRAPHY HEAD: CPT

## 2022-03-22 RX ORDER — IOPAMIDOL 755 MG/ML
100 INJECTION, SOLUTION INTRAVASCULAR ONCE
Status: COMPLETED | OUTPATIENT
Start: 2022-03-22 | End: 2022-03-22

## 2022-03-22 RX ADMIN — IOPAMIDOL 100 ML: 755 INJECTION, SOLUTION INTRAVENOUS at 09:27

## 2022-03-28 ENCOUNTER — MYC MEDICAL ADVICE (OUTPATIENT)
Dept: FAMILY MEDICINE | Facility: OTHER | Age: 22
End: 2022-03-28
Payer: COMMERCIAL

## 2022-03-30 ENCOUNTER — HOSPITAL ENCOUNTER (OUTPATIENT)
Dept: MRI IMAGING | Facility: OTHER | Age: 22
Discharge: HOME | End: 2022-03-30
Attending: PSYCHIATRY & NEUROLOGY
Payer: COMMERCIAL

## 2022-03-30 DIAGNOSIS — R40.4 NONSPECIFIC PAROXYSMAL SPELL: ICD-10-CM

## 2022-03-30 DIAGNOSIS — R42 EPISODES OF VERTIGO OCCURRING DAILY: ICD-10-CM

## 2022-03-30 PROCEDURE — A9575 INJ GADOTERATE MEGLUMI 0.1ML: HCPCS | Performed by: PSYCHIATRY & NEUROLOGY

## 2022-03-30 PROCEDURE — 72156 MRI NECK SPINE W/O & W/DYE: CPT

## 2022-03-30 PROCEDURE — 255N000002 HC RX 255 OP 636: Performed by: PSYCHIATRY & NEUROLOGY

## 2022-03-30 PROCEDURE — 70543 MRI ORBT/FAC/NCK W/O &W/DYE: CPT

## 2022-03-30 RX ADMIN — GADOTERATE MEGLUMINE 11 ML: 376.9 INJECTION INTRAVENOUS at 18:04
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
## 2022-03-31 ENCOUNTER — OFFICE VISIT (OUTPATIENT)
Dept: FAMILY MEDICINE | Facility: OTHER | Age: 22
End: 2022-03-31
Attending: PHYSICIAN ASSISTANT
Payer: COMMERCIAL

## 2022-03-31 VITALS
SYSTOLIC BLOOD PRESSURE: 106 MMHG | RESPIRATION RATE: 12 BRPM | WEIGHT: 127.5 LBS | HEIGHT: 66 IN | OXYGEN SATURATION: 96 % | TEMPERATURE: 96.9 F | BODY MASS INDEX: 20.49 KG/M2 | DIASTOLIC BLOOD PRESSURE: 72 MMHG | HEART RATE: 77 BPM

## 2022-03-31 DIAGNOSIS — R07.9 CHEST PAIN, UNSPECIFIED TYPE: Primary | ICD-10-CM

## 2022-03-31 LAB
ANION GAP SERPL CALCULATED.3IONS-SCNC: 5 MMOL/L (ref 3–14)
ATRIAL RATE - MUSE: 63 BPM
BASOPHILS # BLD AUTO: 0.1 10E3/UL (ref 0–0.2)
BASOPHILS NFR BLD AUTO: 1 %
BUN SERPL-MCNC: 8 MG/DL (ref 7–25)
CALCIUM SERPL-MCNC: 9.6 MG/DL (ref 8.6–10.3)
CHLORIDE BLD-SCNC: 104 MMOL/L (ref 98–107)
CO2 SERPL-SCNC: 31 MMOL/L (ref 21–31)
CREAT SERPL-MCNC: 1.02 MG/DL (ref 0.7–1.3)
DIASTOLIC BLOOD PRESSURE - MUSE: NORMAL MMHG
EOSINOPHIL # BLD AUTO: 0.1 10E3/UL (ref 0–0.7)
EOSINOPHIL NFR BLD AUTO: 2 %
ERYTHROCYTE [DISTWIDTH] IN BLOOD BY AUTOMATED COUNT: 12 % (ref 10–15)
GFR SERPL CREATININE-BSD FRML MDRD: >90 ML/MIN/1.73M2
GLUCOSE BLD-MCNC: 86 MG/DL (ref 70–105)
HCT VFR BLD AUTO: 46.2 % (ref 40–53)
HGB BLD-MCNC: 15.6 G/DL (ref 13.3–17.7)
IMM GRANULOCYTES # BLD: 0 10E3/UL
IMM GRANULOCYTES NFR BLD: 1 %
INTERPRETATION ECG - MUSE: NORMAL
LYMPHOCYTES # BLD AUTO: 1.8 10E3/UL (ref 0.8–5.3)
LYMPHOCYTES NFR BLD AUTO: 40 %
MAGNESIUM SERPL-MCNC: 2.1 MG/DL (ref 1.9–2.7)
MCH RBC QN AUTO: 28.9 PG (ref 26.5–33)
MCHC RBC AUTO-ENTMCNC: 33.8 G/DL (ref 31.5–36.5)
MCV RBC AUTO: 86 FL (ref 78–100)
MONOCYTES # BLD AUTO: 0.4 10E3/UL (ref 0–1.3)
MONOCYTES NFR BLD AUTO: 8 %
NEUTROPHILS # BLD AUTO: 2.2 10E3/UL (ref 1.6–8.3)
NEUTROPHILS NFR BLD AUTO: 48 %
NRBC # BLD AUTO: 0 10E3/UL
NRBC BLD AUTO-RTO: 0 /100
P AXIS - MUSE: 75 DEGREES
PLATELET # BLD AUTO: 304 10E3/UL (ref 150–450)
POTASSIUM BLD-SCNC: 3.9 MMOL/L (ref 3.5–5.1)
PR INTERVAL - MUSE: 156 MS
QRS DURATION - MUSE: 114 MS
QT - MUSE: 402 MS
QTC - MUSE: 404 MS
R AXIS - MUSE: 72 DEGREES
RBC # BLD AUTO: 5.4 10E6/UL (ref 4.4–5.9)
SODIUM SERPL-SCNC: 140 MMOL/L (ref 134–144)
SYSTOLIC BLOOD PRESSURE - MUSE: NORMAL MMHG
T AXIS - MUSE: 50 DEGREES
TSH SERPL DL<=0.005 MIU/L-ACNC: 2.76 MU/L (ref 0.4–4)
VENTRICULAR RATE- MUSE: 61 BPM
WBC # BLD AUTO: 4.4 10E3/UL (ref 4–11)

## 2022-03-31 PROCEDURE — 84443 ASSAY THYROID STIM HORMONE: CPT | Mod: ZL | Performed by: PHYSICIAN ASSISTANT

## 2022-03-31 PROCEDURE — G0463 HOSPITAL OUTPT CLINIC VISIT: HCPCS

## 2022-03-31 PROCEDURE — 93005 ELECTROCARDIOGRAM TRACING: CPT | Performed by: PHYSICIAN ASSISTANT

## 2022-03-31 PROCEDURE — 85025 COMPLETE CBC W/AUTO DIFF WBC: CPT | Mod: ZL | Performed by: PHYSICIAN ASSISTANT

## 2022-03-31 PROCEDURE — 93010 ELECTROCARDIOGRAM REPORT: CPT | Performed by: INTERNAL MEDICINE

## 2022-03-31 PROCEDURE — 36415 COLL VENOUS BLD VENIPUNCTURE: CPT | Mod: ZL | Performed by: PHYSICIAN ASSISTANT

## 2022-03-31 PROCEDURE — 83735 ASSAY OF MAGNESIUM: CPT | Mod: ZL | Performed by: PHYSICIAN ASSISTANT

## 2022-03-31 PROCEDURE — 82310 ASSAY OF CALCIUM: CPT | Mod: ZL | Performed by: PHYSICIAN ASSISTANT

## 2022-03-31 PROCEDURE — 99213 OFFICE O/P EST LOW 20 MIN: CPT | Performed by: PHYSICIAN ASSISTANT

## 2022-03-31 ASSESSMENT — PAIN SCALES - GENERAL: PAINLEVEL: NO PAIN (0)

## 2022-03-31 ASSESSMENT — PATIENT HEALTH QUESTIONNAIRE - PHQ9
SUM OF ALL RESPONSES TO PHQ QUESTIONS 1-9: 4
10. IF YOU CHECKED OFF ANY PROBLEMS, HOW DIFFICULT HAVE THESE PROBLEMS MADE IT FOR YOU TO DO YOUR WORK, TAKE CARE OF THINGS AT HOME, OR GET ALONG WITH OTHER PEOPLE: NOT DIFFICULT AT ALL
SUM OF ALL RESPONSES TO PHQ QUESTIONS 1-9: 4

## 2022-03-31 NOTE — NURSING NOTE
Patient presents to clinic requesting a referral for cardiology as his neurologist suggested that he follow up with cardio. Patient has has chest pain on/off that started back in 2015. No known cause.  Adeline Mahajan LPN ....................  3/31/2022   8:01 AM

## 2022-04-01 ENCOUNTER — HOSPITAL ENCOUNTER (EMERGENCY)
Facility: OTHER | Age: 22
Discharge: HOME | End: 2022-04-02
Attending: EMERGENCY MEDICINE | Admitting: EMERGENCY MEDICINE
Payer: COMMERCIAL

## 2022-04-01 VITALS
OXYGEN SATURATION: 96 % | SYSTOLIC BLOOD PRESSURE: 115 MMHG | TEMPERATURE: 97.5 F | BODY MASS INDEX: 20.18 KG/M2 | DIASTOLIC BLOOD PRESSURE: 75 MMHG | WEIGHT: 125 LBS | HEART RATE: 80 BPM | RESPIRATION RATE: 18 BRPM

## 2022-04-01 DIAGNOSIS — R42 DIZZINESS: ICD-10-CM

## 2022-04-01 PROCEDURE — 99283 EMERGENCY DEPT VISIT LOW MDM: CPT | Performed by: EMERGENCY MEDICINE

## 2022-04-01 PROCEDURE — 99282 EMERGENCY DEPT VISIT SF MDM: CPT | Performed by: EMERGENCY MEDICINE

## 2022-04-01 ASSESSMENT — PATIENT HEALTH QUESTIONNAIRE - PHQ9: SUM OF ALL RESPONSES TO PHQ QUESTIONS 1-9: 4

## 2022-04-01 NOTE — Clinical Note
Slim Bueno was seen and treated in our emergency department on 4/1/2022.  He may return to work on 04/04/2022.       If you have any questions or concerns, please don't hesitate to call.      Bridget Le MD

## 2022-04-02 NOTE — ED TRIAGE NOTES
ED Nursing Triage Note (General)   ________________________________    Slim Bueno is a 21 year old Male that presents to triage private car  With history of  Dizziness/nausea reported by patient   Significant symptoms had onset 1 hour(s) ago.  /69   Pulse 80   Temp 97.5  F (36.4  C) (Tympanic)   Resp 18   Wt 56.7 kg (125 lb)   SpO2 96%   BMI 20.18 kg/m  t  Patient appears alert , in no acute distress., and calm behavior.    GCS Total = 15  Airway: intact  Breathing noted as Normal  Circulation Normal  Skin:  Normal  Action taken:  Waiting room      PRE HOSPITAL PRIOR LIVING SITUATION Alone
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
## 2022-05-31 ENCOUNTER — OFFICE VISIT (OUTPATIENT)
Dept: FAMILY MEDICINE | Facility: OTHER | Age: 22
End: 2022-05-31
Attending: NURSE PRACTITIONER
Payer: COMMERCIAL

## 2022-05-31 VITALS
WEIGHT: 130.4 LBS | BODY MASS INDEX: 21.05 KG/M2 | OXYGEN SATURATION: 97 % | RESPIRATION RATE: 12 BRPM | TEMPERATURE: 97.6 F | DIASTOLIC BLOOD PRESSURE: 66 MMHG | SYSTOLIC BLOOD PRESSURE: 102 MMHG | HEART RATE: 82 BPM

## 2022-05-31 DIAGNOSIS — J02.9 SORE THROAT: ICD-10-CM

## 2022-05-31 DIAGNOSIS — J02.9 VIRAL PHARYNGITIS: Primary | ICD-10-CM

## 2022-05-31 LAB — GROUP A STREP BY PCR: NOT DETECTED

## 2022-05-31 PROCEDURE — 87651 STREP A DNA AMP PROBE: CPT | Mod: ZL | Performed by: NURSE PRACTITIONER

## 2022-05-31 PROCEDURE — 99213 OFFICE O/P EST LOW 20 MIN: CPT | Performed by: NURSE PRACTITIONER

## 2022-05-31 PROCEDURE — G0463 HOSPITAL OUTPT CLINIC VISIT: HCPCS

## 2022-05-31 ASSESSMENT — PAIN SCALES - GENERAL: PAINLEVEL: SEVERE PAIN (7)
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
## 2022-05-31 NOTE — NURSING NOTE
"Chief Complaint   Patient presents with     Pharyngitis     And headache     Patient presents to  with s/s stated above. Patient stated his s/s started yesterday.    Initial /66 (BP Location: Right arm, Patient Position: Sitting, Cuff Size: Adult Regular)   Pulse 82   Temp 97.6  F (36.4  C) (Tympanic)   Resp 12   Wt 59.1 kg (130 lb 6.4 oz)   SpO2 97%   BMI 21.05 kg/m   Estimated body mass index is 21.05 kg/m  as calculated from the following:    Height as of 3/31/22: 1.676 m (5' 6\").    Weight as of this encounter: 59.1 kg (130 lb 6.4 oz).  Medication Reconciliation: Completed     Advanced Care Directive Reviewed    Tien Parada LPN  "

## 2022-05-31 NOTE — LETTER
[facility]  [address]  [place] [address]  Phone: [phone]  Fax: [phone]    May 31, 2022        Slim Bueno     [facility] [id number]          To whom it may concern:    RE: Slim Bueno    Patient was seen  today at our clinic and missed work today due to sore throat.  Strep test pending.    Please contact me for questions or concerns.      Sincerely,        Arlene Garibay NP

## 2022-06-02 DIAGNOSIS — R42 DIZZINESS: ICD-10-CM

## 2022-06-02 RX ORDER — MECLIZINE HYDROCHLORIDE 25 MG/1
TABLET ORAL
Qty: 90 TABLET | Refills: 1 | Status: SHIPPED | OUTPATIENT
Start: 2022-06-02 | End: 2023-02-01

## 2022-06-02 NOTE — TELEPHONE ENCOUNTER
Miguel sent Rx request for the following:      MECLIZINE 25MG OTC TABLETS      Last Prescription Date:   10/12/2021  Last Fill Qty/Refills:         90, R-1    Last Office Visit:              3/31/2022   Future Office visit:           none    Giacomo Grimaldo RN, BSN  ....................  6/2/2022   12:21 PM

## 2022-08-09 ENCOUNTER — HOSPITAL ENCOUNTER (EMERGENCY)
Facility: OTHER | Age: 22
Discharge: HOME | End: 2022-08-09
Attending: EMERGENCY MEDICINE | Admitting: EMERGENCY MEDICINE
Payer: COMMERCIAL

## 2022-08-09 VITALS
HEART RATE: 66 BPM | DIASTOLIC BLOOD PRESSURE: 82 MMHG | OXYGEN SATURATION: 95 % | BODY MASS INDEX: 20.18 KG/M2 | TEMPERATURE: 97.9 F | WEIGHT: 125 LBS | RESPIRATION RATE: 18 BRPM | SYSTOLIC BLOOD PRESSURE: 106 MMHG

## 2022-08-09 DIAGNOSIS — J02.9 ACUTE SORE THROAT: ICD-10-CM

## 2022-08-09 DIAGNOSIS — R05.9 COUGH: ICD-10-CM

## 2022-08-09 LAB
FLUAV RNA SPEC QL NAA+PROBE: NEGATIVE
FLUBV RNA RESP QL NAA+PROBE: NEGATIVE
GROUP A STREP BY PCR: NOT DETECTED
RSV RNA SPEC NAA+PROBE: NEGATIVE
SARS-COV-2 RNA RESP QL NAA+PROBE: NEGATIVE

## 2022-08-09 PROCEDURE — 87637 SARSCOV2&INF A&B&RSV AMP PRB: CPT | Performed by: EMERGENCY MEDICINE

## 2022-08-09 PROCEDURE — 250N000009 HC RX 250: Performed by: EMERGENCY MEDICINE

## 2022-08-09 PROCEDURE — 99283 EMERGENCY DEPT VISIT LOW MDM: CPT | Performed by: EMERGENCY MEDICINE

## 2022-08-09 PROCEDURE — 250N000013 HC RX MED GY IP 250 OP 250 PS 637: Performed by: EMERGENCY MEDICINE

## 2022-08-09 PROCEDURE — C9803 HOPD COVID-19 SPEC COLLECT: HCPCS | Performed by: EMERGENCY MEDICINE

## 2022-08-09 PROCEDURE — 87651 STREP A DNA AMP PROBE: CPT | Performed by: EMERGENCY MEDICINE

## 2022-08-09 RX ORDER — ACETAMINOPHEN 325 MG/1
975 TABLET ORAL ONCE
Status: COMPLETED | OUTPATIENT
Start: 2022-08-09 | End: 2022-08-09

## 2022-08-09 RX ORDER — DEXAMETHASONE SODIUM PHOSPHATE 4 MG/ML
10 VIAL (ML) INJECTION ONCE
Status: COMPLETED | OUTPATIENT
Start: 2022-08-09 | End: 2022-08-09

## 2022-08-09 RX ADMIN — ACETAMINOPHEN 975 MG: 325 TABLET, FILM COATED ORAL at 00:41

## 2022-08-09 RX ADMIN — IBUPROFEN 600 MG: 200 TABLET, FILM COATED ORAL at 00:41

## 2022-08-09 RX ADMIN — DEXAMETHASONE SODIUM PHOSPHATE 10 MG: 4 INJECTION, SOLUTION INTRAMUSCULAR; INTRAVENOUS at 00:41
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
## 2022-08-09 NOTE — ED TRIAGE NOTES
Pt presents to ED with c/o sore throat x1 week and cough x1 day. States throat is getting worse.       Triage Assessment     Row Name 08/09/22 0023       Triage Assessment (Adult)    Airway WDL WDL       Respiratory WDL    Respiratory WDL WDL       Skin Circulation/Temperature WDL    Skin Circulation/Temperature WDL WDL       Cardiac WDL    Cardiac WDL WDL       Peripheral/Neurovascular WDL    Peripheral Neurovascular WDL WDL       Cognitive/Neuro/Behavioral WDL    Cognitive/Neuro/Behavioral WDL WDL

## 2022-08-12 ENCOUNTER — OFFICE VISIT (OUTPATIENT)
Dept: FAMILY MEDICINE | Facility: OTHER | Age: 22
End: 2022-08-12
Attending: NURSE PRACTITIONER
Payer: COMMERCIAL

## 2022-08-12 ENCOUNTER — HOSPITAL ENCOUNTER (OUTPATIENT)
Dept: GENERAL RADIOLOGY | Facility: OTHER | Age: 22
Discharge: HOME | End: 2022-08-12
Attending: NURSE PRACTITIONER
Payer: COMMERCIAL

## 2022-08-12 VITALS
RESPIRATION RATE: 20 BRPM | DIASTOLIC BLOOD PRESSURE: 68 MMHG | HEART RATE: 117 BPM | WEIGHT: 131.9 LBS | OXYGEN SATURATION: 98 % | SYSTOLIC BLOOD PRESSURE: 118 MMHG | TEMPERATURE: 97.3 F | BODY MASS INDEX: 21.29 KG/M2

## 2022-08-12 DIAGNOSIS — H66.92 ACUTE OTITIS MEDIA, LEFT: ICD-10-CM

## 2022-08-12 DIAGNOSIS — R05.9 COUGH: Primary | ICD-10-CM

## 2022-08-12 DIAGNOSIS — J20.9 ACUTE BRONCHITIS, UNSPECIFIED ORGANISM: ICD-10-CM

## 2022-08-12 PROCEDURE — G0463 HOSPITAL OUTPT CLINIC VISIT: HCPCS

## 2022-08-12 PROCEDURE — 99213 OFFICE O/P EST LOW 20 MIN: CPT | Performed by: NURSE PRACTITIONER

## 2022-08-12 PROCEDURE — 71046 X-RAY EXAM CHEST 2 VIEWS: CPT

## 2022-08-12 RX ORDER — AMOXICILLIN 875 MG
875 TABLET ORAL 2 TIMES DAILY
Qty: 14 TABLET | Refills: 0 | Status: SHIPPED | OUTPATIENT
Start: 2022-08-12 | End: 2022-08-19

## 2022-08-12 ASSESSMENT — PAIN SCALES - GENERAL: PAINLEVEL: MILD PAIN (3)

## 2022-08-12 NOTE — NURSING NOTE
"Chief Complaint   Patient presents with     Cough     Started about 8-5-22       Medication reconciliation completed.    FOOD SECURITY SCREENING QUESTIONS:    The next two questions are to help us understand your food security.  If you are feeling you need any assistance in this area, we have resources available to support you today.    Hunger Vital Signs:  Within the past 12 months we worried whether our food would run out before we got money to buy more. Never  Within the past 12 months the food we bought just didn't last and we didn't have money to get more. Never    Initial /68 (BP Location: Right arm, Patient Position: Sitting, Cuff Size: Adult Regular)   Pulse 117   Temp 97.3  F (36.3  C) (Temporal)   Resp 20   Wt 59.8 kg (131 lb 14.4 oz)   SpO2 98%   BMI 21.29 kg/m   Estimated body mass index is 21.29 kg/m  as calculated from the following:    Height as of 3/31/22: 1.676 m (5' 6\").    Weight as of this encounter: 59.8 kg (131 lb 14.4 oz).       Florence Bailey LPN .......  8/12/2022  12:08 PM  "
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
## 2022-08-12 NOTE — PATIENT INSTRUCTIONS
Chest xr today is clear, you do not have pneumonia. Oxygen is 98%. This is also reassuring. Most coughs that are non productive are caused by viral illness which can cause some inflammation. Bronchitis is best treated with OTC cough suppression medication (robitussin DM) and antibiotics are generally not indicated for a cough caused by a virus for 1 week in time. If cough does not show signs of improvement in the next 10 days or so, recommend follow up.

## 2022-09-28 ENCOUNTER — HOSPITAL ENCOUNTER (EMERGENCY)
Facility: OTHER | Age: 22
Discharge: HOME | End: 2022-09-28
Attending: FAMILY MEDICINE | Admitting: FAMILY MEDICINE
Payer: COMMERCIAL

## 2022-09-28 VITALS
HEIGHT: 66 IN | TEMPERATURE: 97 F | BODY MASS INDEX: 20.09 KG/M2 | WEIGHT: 125 LBS | SYSTOLIC BLOOD PRESSURE: 101 MMHG | HEART RATE: 62 BPM | OXYGEN SATURATION: 94 % | RESPIRATION RATE: 14 BRPM | DIASTOLIC BLOOD PRESSURE: 54 MMHG

## 2022-09-28 DIAGNOSIS — R42 LIGHTHEADEDNESS: ICD-10-CM

## 2022-09-28 PROCEDURE — 99282 EMERGENCY DEPT VISIT SF MDM: CPT | Performed by: FAMILY MEDICINE

## 2022-09-28 PROCEDURE — 99283 EMERGENCY DEPT VISIT LOW MDM: CPT | Performed by: FAMILY MEDICINE

## 2022-09-28 PROCEDURE — 250N000013 HC RX MED GY IP 250 OP 250 PS 637: Performed by: FAMILY MEDICINE

## 2022-09-28 RX ORDER — MECLIZINE HCL 12.5 MG 12.5 MG/1
25 TABLET ORAL ONCE
Status: COMPLETED | OUTPATIENT
Start: 2022-09-28 | End: 2022-09-28

## 2022-09-28 RX ADMIN — MECLIZINE 25 MG: 12.5 TABLET ORAL at 21:34

## 2022-09-28 ASSESSMENT — ACTIVITIES OF DAILY LIVING (ADL): ADLS_ACUITY_SCORE: 35

## 2022-09-28 NOTE — Clinical Note
Slim Bueno was seen and treated in our emergency department on 9/28/2022.  He may return to work on 09/29/2022.  ?     If you have any questions or concerns, please don't hesitate to call.      Analy Florence RN

## 2022-09-29 ASSESSMENT — ENCOUNTER SYMPTOMS
WEAKNESS: 0
CONFUSION: 0
DIZZINESS: 1
AGITATION: 0
LIGHT-HEADEDNESS: 1

## 2022-09-29 NOTE — ED TRIAGE NOTES
"Pt presents to the ED via private car with a chief complaint of Dizziness, \"feeling off balance, and nauseated. Pt says it started tonight around 2000. Pt states \"I have had this issue before\".    /73   Pulse 73   Temp 97  F (36.1  C) (Tympanic)   Resp 18   Ht 1.676 m (5' 6\")   Wt 56.7 kg (125 lb)   SpO2 100%   BMI 20.18 kg/m         Triage Assessment     Row Name 09/28/22 2123       Triage Assessment (Adult)    Airway WDL WDL       Respiratory WDL    Respiratory WDL WDL       Skin Circulation/Temperature WDL    Skin Circulation/Temperature WDL WDL       Cardiac WDL    Cardiac WDL WDL       Cognitive/Neuro/Behavioral WDL    Cognitive/Neuro/Behavioral WDL X  dizzy    Level of Consciousness alert    Arousal Level opens eyes spontaneously              "
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
## 2022-10-28 ENCOUNTER — OFFICE VISIT (OUTPATIENT)
Dept: FAMILY MEDICINE | Facility: OTHER | Age: 22
End: 2022-10-28
Attending: NURSE PRACTITIONER
Payer: COMMERCIAL

## 2022-10-28 VITALS
BODY MASS INDEX: 21.31 KG/M2 | RESPIRATION RATE: 16 BRPM | WEIGHT: 132 LBS | OXYGEN SATURATION: 97 % | SYSTOLIC BLOOD PRESSURE: 102 MMHG | TEMPERATURE: 98.2 F | DIASTOLIC BLOOD PRESSURE: 60 MMHG | HEART RATE: 94 BPM

## 2022-10-28 DIAGNOSIS — H65.93 MIDDLE EAR EFFUSION, BILATERAL: Primary | ICD-10-CM

## 2022-10-28 DIAGNOSIS — H92.03 OTALGIA, BILATERAL: ICD-10-CM

## 2022-10-28 PROCEDURE — G0463 HOSPITAL OUTPT CLINIC VISIT: HCPCS

## 2022-10-28 PROCEDURE — 99213 OFFICE O/P EST LOW 20 MIN: CPT | Performed by: NURSE PRACTITIONER

## 2022-10-28 ASSESSMENT — PAIN SCALES - GENERAL: PAINLEVEL: MILD PAIN (2)

## 2022-10-28 NOTE — PATIENT INSTRUCTIONS
Clear fluids in ears but really no evidence of infection - did not treat with antibiotics today as they were not necessary and we agreed upon this. Follow up if worsening pain in the next week or two.
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
## 2022-10-28 NOTE — NURSING NOTE
"Chief Complaint   Patient presents with     Ear Problem     Ear pain and draining for a few weeks   He has had a little pain in both ears and they have been draining for 2 weeks. He wife changed the pillow cases today and the pillow had small patches of stain on them from his ears draining.  Tari Grayson LPN..................10/28/2022   5:02 PM      Initial /60   Pulse 94   Temp 98.2  F (36.8  C) (Temporal)   Resp 16   Wt 59.9 kg (132 lb)   SpO2 97%   BMI 21.31 kg/m   Estimated body mass index is 21.31 kg/m  as calculated from the following:    Height as of 9/28/22: 1.676 m (5' 6\").    Weight as of this encounter: 59.9 kg (132 lb).  Medication Reconciliation: complete    FOOD SECURITY SCREENING QUESTIONS  Hunger Vital Signs:  Within the past 12 months we worried whether our food would run out before we got money to buy more. Never  Within the past 12 months the food we bought just didn't last and we didn't have money to get more. Never        Advance care directive on file? no  Advance care directive provided to patient? declined     Tari Grayson LPN  "

## 2023-01-04 ENCOUNTER — OFFICE VISIT (OUTPATIENT)
Dept: FAMILY MEDICINE | Facility: OTHER | Age: 23
End: 2023-01-04
Payer: COMMERCIAL

## 2023-01-04 VITALS
DIASTOLIC BLOOD PRESSURE: 60 MMHG | HEART RATE: 81 BPM | TEMPERATURE: 97.7 F | SYSTOLIC BLOOD PRESSURE: 98 MMHG | BODY MASS INDEX: 20.98 KG/M2 | OXYGEN SATURATION: 96 % | WEIGHT: 130 LBS | RESPIRATION RATE: 16 BRPM

## 2023-01-04 DIAGNOSIS — J02.9 ACUTE PHARYNGITIS, UNSPECIFIED ETIOLOGY: Primary | ICD-10-CM

## 2023-01-04 DIAGNOSIS — J06.9 VIRAL URI: ICD-10-CM

## 2023-01-04 LAB — GROUP A STREP BY PCR: NOT DETECTED

## 2023-01-04 PROCEDURE — G0463 HOSPITAL OUTPT CLINIC VISIT: HCPCS

## 2023-01-04 PROCEDURE — 87651 STREP A DNA AMP PROBE: CPT | Mod: ZL

## 2023-01-04 PROCEDURE — 99213 OFFICE O/P EST LOW 20 MIN: CPT

## 2023-01-04 ASSESSMENT — PAIN SCALES - GENERAL: PAINLEVEL: NO PAIN (0)

## 2023-01-04 NOTE — PATIENT INSTRUCTIONS
For viral infection: Symptomatic treatment - Encouraged fluids, salt water gargles, honey (only if greater than 1 year in age due to risk of botulism), elevation, humidifier, sinus rinse/netti pot, lozenges, tea, topical vapor rub, popsicles, rest, etc     If strep is positive:    Recommend taking entire course of antibiotic even if feeling better prior to this. You may take a daily probiotic while on this medication.  Recommend changing toothbrush on day 2.    You will be contagious for 24 hour after starting antibiotic.   Recommend alternating Tylenol and ibuprofen every 4-6 hours as needed.  Also recommend salt water gargles, humidifier, throat lozenges if old enough not to be a choking hazard, warm honey if greater than 12 months in age, other home remedies as needed.   If changing or worsening symptoms such as: Worsening fevers, pain, inability to handle own secretions, etc., recommend follow-up.      Follow up if symptoms worsen or fail to improve
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
## 2023-01-04 NOTE — NURSING NOTE
Pt presents to  for fatigue, headaches, stomach aches, and slight sore throat. Pt states he has felt feverish, but no fever. Started yesterday evening.    Yolanda Fuentes on 1/4/2023 at 1:33 PM

## 2023-01-04 NOTE — LETTER
[facility]  [address]  [place] [address]  Phone: [phone]  Fax: [phone]    January 4, 2023        Slim Bueno  [address] [facility] [address]          To whom it may concern:    RE: Slim Bueno    Patient was seen and treated today at our clinic. Test results are pending, may return to work based on test results.     Please contact me for questions or concerns.      Sincerely,        DANIEL SINGLETON CNP

## 2023-01-29 ENCOUNTER — HEALTH MAINTENANCE LETTER (OUTPATIENT)
Age: 23
End: 2023-01-29

## 2023-02-01 ENCOUNTER — OFFICE VISIT (OUTPATIENT)
Dept: FAMILY MEDICINE | Facility: OTHER | Age: 23
End: 2023-02-01
Attending: PHYSICIAN ASSISTANT
Payer: COMMERCIAL

## 2023-02-01 ENCOUNTER — HOSPITAL ENCOUNTER (OUTPATIENT)
Dept: GENERAL RADIOLOGY | Facility: OTHER | Age: 23
Discharge: HOME | End: 2023-02-01
Attending: PHYSICIAN ASSISTANT
Payer: COMMERCIAL

## 2023-02-01 VITALS
RESPIRATION RATE: 16 BRPM | DIASTOLIC BLOOD PRESSURE: 70 MMHG | BODY MASS INDEX: 21.79 KG/M2 | TEMPERATURE: 98.4 F | SYSTOLIC BLOOD PRESSURE: 110 MMHG | HEART RATE: 76 BPM | WEIGHT: 135 LBS | OXYGEN SATURATION: 96 %

## 2023-02-01 DIAGNOSIS — M25.512 ACUTE PAIN OF LEFT SHOULDER: ICD-10-CM

## 2023-02-01 DIAGNOSIS — S46.912A STRAIN OF LEFT SHOULDER, INITIAL ENCOUNTER: Primary | ICD-10-CM

## 2023-02-01 PROCEDURE — G0463 HOSPITAL OUTPT CLINIC VISIT: HCPCS | Mod: 25

## 2023-02-01 PROCEDURE — 99213 OFFICE O/P EST LOW 20 MIN: CPT | Performed by: PHYSICIAN ASSISTANT

## 2023-02-01 PROCEDURE — G0463 HOSPITAL OUTPT CLINIC VISIT: HCPCS

## 2023-02-01 PROCEDURE — 73030 X-RAY EXAM OF SHOULDER: CPT | Mod: LT

## 2023-02-01 ASSESSMENT — PAIN SCALES - GENERAL: PAINLEVEL: SEVERE PAIN (6)

## 2023-02-01 NOTE — PATIENT INSTRUCTIONS
Encouraged to take ibuprofen (400-800 mg) for relief up to 4 times per day.  Encouraged rest and elevation.  Encouraged to use ice or heat 15 minutes at a time several times per day to decrease pain. Return to clinic in 1-2 weeks as necessary for persistent pain. Return to clinic with any change or worsening of symptoms.   Encouraged arm circles and sliding the arm up a wall to increase range of motion.

## 2023-02-01 NOTE — NURSING NOTE
Pt presents to clinic today for left shoulder pain for the last 2 weeks. 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      FOOD SECURITY SCREENING QUESTIONS:    The next two questions are to help us understand your food security.  If you are feeling you need any assistance in this area, we have resources available to support you today.    Hunger Vital Signs:  Within the past 12 months we worried whether our food would run out before we got money to buy more. Never  Within the past 12 months the food we bought just didn't last and we didn't have money to get more. Never            Medication Reconciliation: complete  Iris Alejandre, BRODY,LPN on 2/1/2023 at 4:05 PM

## 2023-02-01 NOTE — PROGRESS NOTES
Assessment & Plan   Problem List Items Addressed This Visit    None  Visit Diagnoses     Strain of left shoulder, initial encounter    -  Primary    Acute pain of left shoulder        Relevant Orders    XR Shoulder Left G/E 3 Views (Completed)         Completed right shoulder xray.  I personally reviewed the xray. I found no fracture appreciated upon initial read of xray.  Final read pending by radiology.    Encouraged to take ibuprofen for relief up to 4 times per day.  Encouraged rest and elevation.  Encouraged to use ice or heat 15 minutes at a time several times per day to decrease pain. Return to clinic in 1-2 weeks as necessary for persistent pain. Return to clinic with any change or worsening of symptoms.   Can call for physical therapy referral if needed.    Ordering of each unique test    See Patient Instructions    Return if symptoms worsen or fail to improve.    Shannan Delarosa PA-C  [facility]    Maynor Smalls is a 22 year old, presenting for the following health issues:  Shoulder Pain      Shoulder Pain    History of Present Illness       Reason for visit:  Shoulder pain    He eats 2-3 servings of fruits and vegetables daily.He consumes 4 sweetened beverage(s) daily.He exercises with enough effort to increase his heart rate 10 to 19 minutes per day.  He exercises with enough effort to increase his heart rate 3 or less days per week.   He is taking medications regularly.       Pain History:  When did you first notice your pain? - Acute Pain   Have you seen anyone else for your pain? No  Where in your body do you have pain? left shoulder    2 weeks ago patient was cleaning a large room.  He was wiping down tables and mopping.  As opposed to taking his time he did the room very quickly in 10 minutes.  Unsure if he messed up his shoulder at that time.  The next day his shoulder started hurting.  Since then he has had increased left shoulder pain.  Hurts in the left anterior and  top of the outer shoulder.  Right-handed.  He was carrying a heavy mop at the time.  Using Advil as needed.  No ice or heat.  Pain with lifting his shoulder up.  No bruising or swelling appreciated.  No neck pain.    Review of Systems   Constitutional, HEENT, cardiovascular, pulmonary, gi and gu systems are negative, except as otherwise noted.      Objective    /70 (BP Location: Right arm, Patient Position: Sitting, Cuff Size: Adult Regular)   Pulse 76   Temp 98.4  F (36.9  C) (Tympanic)   Resp 16   Wt 61.2 kg (135 lb)   SpO2 96%   BMI 21.79 kg/m    Body mass index is 21.79 kg/m .  Physical Exam  Vitals and nursing note reviewed.   Constitutional:       Appearance: Normal appearance.   Musculoskeletal:         General: Tenderness present. No swelling or signs of injury. Normal range of motion.      Comments: No bicipital groove pain to palpation.  No spinal or paraspinous muscle pain to palpation.  Mild left anterior, superior, and lateral shoulder pain with palpation.  Left shoulder pain with abduction at 90 degrees along with internal rotation.  No bruising or swelling appreciated.   Skin:     General: Skin is warm and dry.   Neurological:      General: No focal deficit present.      Mental Status: He is alert and oriented to person, place, and time.      Gait: Gait normal.   Psychiatric:         Mood and Affect: Mood normal.         Behavior: Behavior normal.

## 2023-02-15 ENCOUNTER — OFFICE VISIT (OUTPATIENT)
Dept: FAMILY MEDICINE | Facility: OTHER | Age: 23
End: 2023-02-15
Payer: COMMERCIAL

## 2023-02-15 VITALS
OXYGEN SATURATION: 95 % | HEART RATE: 112 BPM | TEMPERATURE: 100.1 F | BODY MASS INDEX: 19.94 KG/M2 | HEIGHT: 66 IN | WEIGHT: 124.1 LBS | DIASTOLIC BLOOD PRESSURE: 60 MMHG | SYSTOLIC BLOOD PRESSURE: 110 MMHG | RESPIRATION RATE: 20 BRPM

## 2023-02-15 DIAGNOSIS — R11.10 VOMITING AND DIARRHEA: Primary | ICD-10-CM

## 2023-02-15 DIAGNOSIS — R19.7 VOMITING AND DIARRHEA: Primary | ICD-10-CM

## 2023-02-15 PROCEDURE — G0463 HOSPITAL OUTPT CLINIC VISIT: HCPCS

## 2023-02-15 PROCEDURE — 99213 OFFICE O/P EST LOW 20 MIN: CPT | Performed by: FAMILY MEDICINE

## 2023-02-15 RX ORDER — ONDANSETRON 4 MG/1
4 TABLET, ORALLY DISINTEGRATING ORAL EVERY 8 HOURS PRN
Qty: 10 TABLET | Refills: 0 | Status: SHIPPED | OUTPATIENT
Start: 2023-02-15

## 2023-02-15 ASSESSMENT — PAIN SCALES - GENERAL: PAINLEVEL: NO PAIN (0)

## 2023-02-15 NOTE — LETTER
[facility]  [address]  [place] [address]  Phone: [phone]  Fax: [phone]    February 15, 2023        Slim Bueno  [address] [facility] [address]          To whom it may concern:    RE: Slim Londonbrough    Patient was seen and treated today at our clinic for fever and diarrhea.     Please contact me for questions or concerns.      Sincerely,        Marcello Ferguson MD

## 2023-02-16 ASSESSMENT — ENCOUNTER SYMPTOMS
FEVER: 1
DIARRHEA: 1

## 2023-02-16 NOTE — PROGRESS NOTES
"  SUBJECTIVE:   Slim Bueno is a 22 year old male who presents to clinic today for the following health issues:  Diarrhea fever  Patient arrives here for diarrhea fever.  Started this morning.  Initially had trouble keeping anything down.  That has improved.  No recent antibiotics.  Patient reports no bloody mucousy diarrhea.  Patient is also requesting a work slip no exposures that he is aware of.  He has not been abnormal foods.    Diarrhea  Associated symptoms include a fever.   Fever  Associated symptoms include a fever.             Review of Systems   Constitutional: Positive for fever.   Gastrointestinal: Positive for diarrhea.        OBJECTIVE:     /60 (BP Location: Right arm, Patient Position: Sitting, Cuff Size: Adult Regular)   Pulse 112   Temp 100.1  F (37.8  C) (Tympanic)   Resp 20   Ht 1.664 m (5' 5.5\")   Wt 56.3 kg (124 lb 1.6 oz)   SpO2 95%   BMI 20.34 kg/m    Body mass index is 20.34 kg/m .  Physical Exam  Constitutional:       Appearance: Normal appearance.   Pulmonary:      Effort: Pulmonary effort is normal.   Abdominal:      General: Abdomen is flat. There is no distension.      Tenderness: There is no abdominal tenderness. There is no guarding.   Neurological:      Mental Status: He is alert.   Psychiatric:         Mood and Affect: Mood normal.         Thought Content: Thought content normal.             ASSESSMENT/PLAN:         (R11.10,  R19.7) Vomiting and diarrhea  (primary encounter diagnosis) likely a viral gastroenteritis  Comment: Also discussed using Imodium.  Plan: ondansetron (ZOFRAN ODT) 4 MG ODT tab  Follow-up if getting           Marcello Ferguson MD  [facility]  "

## 2023-02-16 NOTE — NURSING NOTE
Pt here for diarrhea, HA, nausea, chills and sweats since today.  Had temp of 101.4 this evening.  Solange Prieto CMA ([facility])......................2/15/2023  6:20 PM       Medication Reconciliation: complete    Solange Prieto CMA  2/15/2023 6:21 PM

## 2023-03-06 ENCOUNTER — OFFICE VISIT (OUTPATIENT)
Dept: FAMILY MEDICINE | Facility: OTHER | Age: 23
End: 2023-03-06
Attending: FAMILY MEDICINE
Payer: COMMERCIAL

## 2023-03-06 VITALS
DIASTOLIC BLOOD PRESSURE: 72 MMHG | OXYGEN SATURATION: 99 % | RESPIRATION RATE: 18 BRPM | TEMPERATURE: 99.4 F | BODY MASS INDEX: 20.71 KG/M2 | SYSTOLIC BLOOD PRESSURE: 122 MMHG | HEART RATE: 115 BPM | WEIGHT: 126.4 LBS

## 2023-03-06 DIAGNOSIS — U07.1 INFECTION DUE TO 2019 NOVEL CORONAVIRUS: ICD-10-CM

## 2023-03-06 DIAGNOSIS — B34.9 VIRAL ILLNESS: Primary | ICD-10-CM

## 2023-03-06 LAB — SARS-COV-2 RNA RESP QL NAA+PROBE: POSITIVE

## 2023-03-06 PROCEDURE — G0463 HOSPITAL OUTPT CLINIC VISIT: HCPCS

## 2023-03-06 PROCEDURE — U0003 INFECTIOUS AGENT DETECTION BY NUCLEIC ACID (DNA OR RNA); SEVERE ACUTE RESPIRATORY SYNDROME CORONAVIRUS 2 (SARS-COV-2) (CORONAVIRUS DISEASE [COVID-19]), AMPLIFIED PROBE TECHNIQUE, MAKING USE OF HIGH THROUGHPUT TECHNOLOGIES AS DESCRIBED BY CMS-2020-01-R: HCPCS | Mod: ZL | Performed by: FAMILY MEDICINE

## 2023-03-06 PROCEDURE — C9803 HOPD COVID-19 SPEC COLLECT: HCPCS | Performed by: FAMILY MEDICINE

## 2023-03-06 PROCEDURE — 99213 OFFICE O/P EST LOW 20 MIN: CPT | Mod: CS | Performed by: FAMILY MEDICINE

## 2023-03-06 ASSESSMENT — PAIN SCALES - GENERAL: PAINLEVEL: MODERATE PAIN (5)

## 2023-03-06 ASSESSMENT — ENCOUNTER SYMPTOMS: HEADACHES: 1

## 2023-03-06 NOTE — LETTER
March 6, 2023      Slim Bueno  [address] [facility] [address]        To Whom It May Concern,       Slim is currently ill with an infection that is contagious and cannot be at work for at least 24 hours after his fever is gone.     Sincerely,        Cornell Muniz MD

## 2023-03-06 NOTE — NURSING NOTE
"Chief Complaint   Patient presents with     Headache       Initial /72   Pulse 115   Temp 99.4  F (37.4  C) (Tympanic)   Resp 18   Wt 57.3 kg (126 lb 6.4 oz)   SpO2 99%   BMI 20.71 kg/m   Estimated body mass index is 20.71 kg/m  as calculated from the following:    Height as of 2/15/23: 1.664 m (5' 5.5\").    Weight as of this encounter: 57.3 kg (126 lb 6.4 oz).  Medication Reconciliation: complete    FOOD SECURITY SCREENING QUESTIONS  Hunger Vital Signs:  Within the past 12 months we worried whether our food would run out before we got money to buy more. Never  Within the past 12 months the food we bought just didn't last and we didn't have money to get more. Never  Shannan Jacinto LPN 3/6/2023 11:46 AM      "

## 2023-03-06 NOTE — PROGRESS NOTES
"  Assessment & Plan     (B34.9) Viral illness  (primary encounter diagnosis)  Comment:    Plan: Symptomatic COVID-19 Virus (Coronavirus) by PCR             (U07.1) Infection due to 2019 novel coronavirus  Comment: this explains the fevers and the headache. Not provided for work. Has no medical conditions that put him at high risk and does not qualify for antiviral treatment   Plan:  Supportive cares.                 No follow-ups on file.    Cornell Muniz MD  [facility]    Maynor Smalls is a 22 year old, presenting for the following health issues:  Headache      Headache     History of Present Illness       Headaches:   Since the patient's last clinic visit, headaches are: worsened  The patient is getting headaches:  [place]  He is not able to do normal daily activities when he has a migraine.  The patient is taking the following rescue/relief medications:  No rescue/relief medications   Patient states \"The relief is inconsistent\" from the rescue/relief medications.   The patient is taking the following medications to prevent migraines:  No medications to prevent migraines  In the past 4 weeks, the patient has gone to an Urgent Care or Emergency Room 0 times times due to headaches.    Reason for visit:  Headache  Symptom onset:  Today  Symptoms include:  Headache nausea  Symptom intensity:  Moderate  Symptom progression:  Staying the same  Had these symptoms before:  No  What makes it worse:  Standing walking  What makes it better:  Laying down    He eats 0-1 servings of fruits and vegetables daily.He consumes 3 sweetened beverage(s) daily.He exercises with enough effort to increase his heart rate 10 to 19 minutes per day.  He exercises with enough effort to increase his heart rate 3 or less days per week.   He is taking medications regularly.   woke up with the headache this morning. Throbbing in the right parietal area. Worse when he touches his scalp. Nausea with this. No photo phobia. " Some phono phobia.laying down helps it. Worse with standing up. No vision changes with this. Temp of 101.4 this morning and a sore throat today. Cough for 1 day, barking like. Has not taken a COVID test. He has been told in the past he might have migraines, no official diagnosis. Dad has severe migraines.     Current Outpatient Medications   Medication     ketoconazole (NIZORAL) 2 % external shampoo     ondansetron (ZOFRAN ODT) 4 MG ODT tab     albuterol (PROAIR HFA/PROVENTIL HFA/VENTOLIN HFA) 108 (90 Base) MCG/ACT inhaler     fluticasone (FLONASE) 50 MCG/ACT nasal spray     phenol (CHLORASEPTIC) 1.4 % spray     No current facility-administered medications for this visit.               Review of Systems   Neurological: Positive for headaches.            Objective    /72   Pulse 115   Temp 99.4  F (37.4  C) (Tympanic)   Resp 18   Wt 57.3 kg (126 lb 6.4 oz)   SpO2 99%   BMI 20.71 kg/m    Body mass index is 20.71 kg/m .  Physical Exam  Constitutional:       Appearance: Normal appearance.   HENT:      Right Ear: Tympanic membrane normal.      Left Ear: Tympanic membrane normal.      Mouth/Throat:      Mouth: Mucous membranes are moist.      Pharynx: No oropharyngeal exudate or posterior oropharyngeal erythema.   Cardiovascular:      Rate and Rhythm: Tachycardia present.      Heart sounds: Normal heart sounds. No murmur heard.  Pulmonary:      Effort: Pulmonary effort is normal. No respiratory distress.      Breath sounds: No stridor.   Musculoskeletal:      Cervical back: No rigidity.   Lymphadenopathy:      Cervical: No cervical adenopathy.   Neurological:      General: No focal deficit present.      Mental Status: He is alert and oriented to person, place, and time.   Psychiatric:         Mood and Affect: Mood normal.         Behavior: Behavior normal.         Thought Content: Thought content normal.            Results for orders placed or performed in visit on 03/06/23   Symptomatic COVID-19 Virus  (Coronavirus) by PCR Nose     Status: Abnormal    Specimen: Nose; Swab   Result Value Ref Range    SARS CoV2 PCR Positive (A) Negative    Narrative    Testing was performed using the [employer] Xpress SARS-CoV-2 Assay on the [employer]Xpert Instrument Systems. Additional information about this Emergency Use Authorization (EUA) assay can be found via the Lab Guide. This test should be ordered for the detection of SARS-CoV-2 in individuals who meet SARS-CoV-2 clinical and/or epidemiological criteria as well as from individuals without symptoms or other reasons to suspect COVID-19. Test performance for asymptomatic patients has only been established in anterior nasal swab specimens. This test is for in vitro diagnostic use under the FDA EUA for laboratories certified under CLIA to perform high complexity testing. This test has not been FDA cleared or approved. A negative result does not rule out the presence of PCR inhibitors in the specimen or target RNA concentration below the limit of detection for the assay. The possibility of a false negative should be considered if the patient's recent exposure or clinical presentation suggests COVID-19. This test was validated by [facility]. This laboratory is certified under the Clinical Laboratory Improvement Amendments (CLIA) as qualified to perform high complexity clinical laboratory testing.

## 2023-05-25 ENCOUNTER — OFFICE VISIT (OUTPATIENT)
Dept: FAMILY MEDICINE | Facility: OTHER | Age: 23
End: 2023-05-25
Attending: NURSE PRACTITIONER
Payer: COMMERCIAL

## 2023-05-25 VITALS
BODY MASS INDEX: 20.47 KG/M2 | HEART RATE: 100 BPM | OXYGEN SATURATION: 98 % | RESPIRATION RATE: 16 BRPM | SYSTOLIC BLOOD PRESSURE: 118 MMHG | TEMPERATURE: 98.3 F | WEIGHT: 124.9 LBS | DIASTOLIC BLOOD PRESSURE: 74 MMHG

## 2023-05-25 DIAGNOSIS — H92.01 ACUTE EAR PAIN, RIGHT: Primary | ICD-10-CM

## 2023-05-25 DIAGNOSIS — Z01.10 NORMAL EAR EXAM: ICD-10-CM

## 2023-05-25 PROCEDURE — 99212 OFFICE O/P EST SF 10 MIN: CPT | Performed by: NURSE PRACTITIONER

## 2023-05-25 PROCEDURE — G0463 HOSPITAL OUTPT CLINIC VISIT: HCPCS | Performed by: NURSE PRACTITIONER

## 2023-05-25 RX ORDER — MECLIZINE HYDROCHLORIDE 25 MG/1
25 TABLET ORAL 3 TIMES DAILY PRN
COMMUNITY
Start: 2023-05-25

## 2023-05-25 ASSESSMENT — PAIN SCALES - GENERAL: PAINLEVEL: MODERATE PAIN (4)

## 2023-05-25 NOTE — NURSING NOTE
"Chief Complaint   Patient presents with     Ear Problem     Right ear     Patient is here for pain in the right ear that started a few days ago.    Initial /74   Pulse 100   Temp 98.3  F (36.8  C) (Tympanic)   Resp 16   Wt 56.7 kg (124 lb 14.4 oz)   SpO2 98%   BMI 20.47 kg/m   Estimated body mass index is 20.47 kg/m  as calculated from the following:    Height as of 2/15/23: 1.664 m (5' 5.5\").    Weight as of this encounter: 56.7 kg (124 lb 14.4 oz).  Medication Reconciliation: complete    Jaquelin Hung LPN  "
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
## 2023-10-01 PROBLEM — Z00.129 ENCOUNTER FOR ROUTINE CHILD HEALTH EXAMINATION WITHOUT ABNORMAL FINDINGS: Status: RESOLVED | Noted: 2017-05-04 | Resolved: 2022-03-09

## 2024-02-25 ENCOUNTER — HEALTH MAINTENANCE LETTER (OUTPATIENT)
Age: 24
End: 2024-02-25

## 2024-05-30 ENCOUNTER — OFFICE VISIT (OUTPATIENT)
Dept: FAMILY MEDICINE | Facility: OTHER | Age: 24
End: 2024-05-30
Payer: COMMERCIAL

## 2024-05-30 VITALS
HEART RATE: 87 BPM | HEIGHT: 66 IN | RESPIRATION RATE: 19 BRPM | DIASTOLIC BLOOD PRESSURE: 72 MMHG | BODY MASS INDEX: 19.99 KG/M2 | TEMPERATURE: 98.8 F | WEIGHT: 124.4 LBS | SYSTOLIC BLOOD PRESSURE: 120 MMHG | OXYGEN SATURATION: 98 %

## 2024-05-30 DIAGNOSIS — J02.9 SORE THROAT: ICD-10-CM

## 2024-05-30 DIAGNOSIS — R05.1 ACUTE COUGH: Primary | ICD-10-CM

## 2024-05-30 LAB — GROUP A STREP BY PCR: NOT DETECTED

## 2024-05-30 PROCEDURE — G0463 HOSPITAL OUTPT CLINIC VISIT: HCPCS

## 2024-05-30 PROCEDURE — 250N000009 HC RX 250: Performed by: STUDENT IN AN ORGANIZED HEALTH CARE EDUCATION/TRAINING PROGRAM

## 2024-05-30 PROCEDURE — 87651 STREP A DNA AMP PROBE: CPT | Mod: ZL | Performed by: STUDENT IN AN ORGANIZED HEALTH CARE EDUCATION/TRAINING PROGRAM

## 2024-05-30 PROCEDURE — 99213 OFFICE O/P EST LOW 20 MIN: CPT | Performed by: STUDENT IN AN ORGANIZED HEALTH CARE EDUCATION/TRAINING PROGRAM

## 2024-05-30 RX ORDER — DEXAMETHASONE SODIUM PHOSPHATE 4 MG/ML
12 VIAL (ML) INJECTION ONCE
Status: COMPLETED | OUTPATIENT
Start: 2024-05-30 | End: 2024-05-30

## 2024-05-30 RX ORDER — BENZONATATE 100 MG/1
100 CAPSULE ORAL 3 TIMES DAILY PRN
Qty: 30 CAPSULE | Refills: 0 | Status: SHIPPED | OUTPATIENT
Start: 2024-05-30 | End: 2024-06-09

## 2024-05-30 RX ADMIN — DEXAMETHASONE SODIUM PHOSPHATE 12 MG: 4 INJECTION, SOLUTION INTRAMUSCULAR; INTRAVENOUS at 17:48

## 2024-05-30 ASSESSMENT — PAIN SCALES - GENERAL: PAINLEVEL: MODERATE PAIN (4)

## 2024-05-30 NOTE — PATIENT INSTRUCTIONS
Cough/Sore throat    Strep test pending, results show up my chart. If positive, I will call with results.    Tessalon Perles for cough.  Robitussin for suppression of cough.  Mucinex to thin secretions.  Sudafed for congestion.  Cepacol drops.     Lots of fluids.  Cough drops.    Follow-up with primary care if symptoms persist.  Return to rapid clinic or go to the emergency room if symptoms worsen or change.

## 2024-05-30 NOTE — NURSING NOTE
"Chief Complaint   Patient presents with    Pharyngitis    Cough     Patient here with a sore throat and barking cough x6 days.     Initial /72   Pulse 87   Temp 98.8  F (37.1  C) (Tympanic)   Resp 19   Ht 1.676 m (5' 6\")   Wt 56.4 kg (124 lb 6.4 oz)   SpO2 98%   BMI 20.08 kg/m   Estimated body mass index is 20.08 kg/m  as calculated from the following:    Height as of this encounter: 1.676 m (5' 6\").    Weight as of this encounter: 56.4 kg (124 lb 6.4 oz).  Medication Review: complete    The next two questions are to help us understand your food security.  If you are feeling you need any assistance in this area, we have resources available to support you today.           No data to display                  Health Care Directive:  Patient does not have a Health Care Directive or Living Will: Discussed advance care planning with patient; however, patient declined at this time.    Marina Thomas, LPN      "

## 2024-05-30 NOTE — PROGRESS NOTES
"  Assessment & Plan     (R05.1) Acute cough  (primary encounter diagnosis)    Comment: Cough, most likely viral in nature, describing croup like cough. Lung sounds clear, oxygen 98%, afebrile and not tachycardic.     Plan: dexAMETHasone (DECADRON) injectable solution         used ORALLY 12 mg, benzonatate (TESSALON) 100         MG capsule, Group A Streptococcus PCR Throat         Swab          Dexamethasone given in the office. Tessalon Perles as needed for cough. Continue OTC management. Follow up as needed. Return to rapid clinic or ER if symptoms worsen or change.    (J02.9) Sore throat  Comment: Strep negative.   Plan: dexAMETHasone (DECADRON) injectable solution         used ORALLY 12 mg, Group A Streptococcus PCR         Throat Swab          Continue OTC management. Follow up as needed. Return to rapid clinic or ER if symptoms worsen or change.        Maynor Smalls is a 23 year old, presenting for the following health issues:  Pharyngitis and Cough    HPI     Patient presents today with concerns of sore throat and new onset cough.  States sore throat has been about a week, cough over the last couple of days.  He describes it as a \"barky\" cough.  He has been using over-the-counter cough medications, cough drops.  These do seem to be helpful.  Sore throat does seem to have some improvement.  No notable fevers.      Review of Systems  Constitutional, HEENT, cardiovascular, pulmonary, gi and gu systems are negative, except as otherwise noted.        Objective    /72   Pulse 87   Temp 98.8  F (37.1  C) (Tympanic)   Resp 19   Ht 1.676 m (5' 6\")   Wt 56.4 kg (124 lb 6.4 oz)   SpO2 98%   BMI 20.08 kg/m    Body mass index is 20.08 kg/m .    Physical Exam   GENERAL: alert and no distress  EYES: Eyes grossly normal to inspection, PERRL and conjunctivae and sclerae normal  HENT: ear canals and TM's normal, nose air, pharynx with moderate erythema, no edema or exudates, uvula midline, symmetric  NECK: no " adenopathy, no asymmetry, masses, or scars  RESP: For sounding cough, lungs clear to auscultation - no rales, rhonchi or wheezes  CV: regular rate and rhythm, normal S1 S2, no S3 or S4, no murmur, click or rub, no peripheral edema  MS: no gross musculoskeletal defects noted, no edema    Results for orders placed or performed in visit on 05/30/24   Group A Streptococcus PCR Throat Swab     Status: Normal    Specimen: Throat; Swab   Result Value Ref Range    Group A strep by PCR Not Detected Not Detected    Narrative    The Xpert Xpress Strep A test, performed on the [employer]  Instrument Systems, is a rapid, qualitative in vitro diagnostic test for the detection of Streptococcus pyogenes (Group A ß-hemolytic Streptococcus, Strep A) in throat swab specimens from patients with signs and symptoms of pharyngitis. The Xpert Xpress Strep A test can be used as an aid in the diagnosis of Group A Streptococcal pharyngitis. The assay is not intended to monitor treatment for Group A Streptococcus infections. The Xpert Xpress Strep A test utilizes an automated real-time polymerase chain reaction (PCR) to detect Streptococcus pyogenes DNA.         Signed Electronically by: Shannan Blue PA-C

## 2025-03-15 ENCOUNTER — HEALTH MAINTENANCE LETTER (OUTPATIENT)
Age: 25
End: 2025-03-15

## 2025-07-18 ENCOUNTER — APPOINTMENT (OUTPATIENT)
Dept: CT IMAGING | Facility: OTHER | Age: 25
End: 2025-07-18
Attending: PHYSICIAN ASSISTANT

## 2025-07-18 ENCOUNTER — HOSPITAL ENCOUNTER (EMERGENCY)
Facility: OTHER | Age: 25
Discharge: HOME | End: 2025-07-18
Attending: PHYSICIAN ASSISTANT

## 2025-07-18 VITALS
SYSTOLIC BLOOD PRESSURE: 132 MMHG | HEIGHT: 66 IN | TEMPERATURE: 96.6 F | BODY MASS INDEX: 19.93 KG/M2 | HEART RATE: 71 BPM | RESPIRATION RATE: 15 BRPM | OXYGEN SATURATION: 100 % | WEIGHT: 124 LBS | DIASTOLIC BLOOD PRESSURE: 76 MMHG

## 2025-07-18 DIAGNOSIS — S09.90XA INJURY OF HEAD, INITIAL ENCOUNTER: ICD-10-CM
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
## 2025-07-18 DIAGNOSIS — S16.1XXA STRAIN OF NECK MUSCLE, INITIAL ENCOUNTER: ICD-10-CM

## 2025-07-18 DIAGNOSIS — S19.9XXA NECK INJURY, INITIAL ENCOUNTER: ICD-10-CM

## 2025-07-18 PROCEDURE — 99284 EMERGENCY DEPT VISIT MOD MDM: CPT | Performed by: PHYSICIAN ASSISTANT

## 2025-07-18 PROCEDURE — 70450 CT HEAD/BRAIN W/O DYE: CPT | Mod: 26 | Performed by: RADIOLOGY

## 2025-07-18 PROCEDURE — 72125 CT NECK SPINE W/O DYE: CPT

## 2025-07-18 PROCEDURE — 70450 CT HEAD/BRAIN W/O DYE: CPT

## 2025-07-18 PROCEDURE — 99285 EMERGENCY DEPT VISIT HI MDM: CPT | Mod: 25 | Performed by: PHYSICIAN ASSISTANT

## 2025-07-18 PROCEDURE — 72125 CT NECK SPINE W/O DYE: CPT | Mod: 26 | Performed by: RADIOLOGY

## 2025-07-18 RX ORDER — DIAZEPAM 10 MG/2ML
5 INJECTION, SOLUTION INTRAMUSCULAR; INTRAVENOUS ONCE
Status: DISCONTINUED | OUTPATIENT
Start: 2025-07-18 | End: 2025-07-18

## 2025-07-18 ASSESSMENT — COLUMBIA-SUICIDE SEVERITY RATING SCALE - C-SSRS
1. IN THE PAST MONTH, HAVE YOU WISHED YOU WERE DEAD OR WISHED YOU COULD GO TO SLEEP AND NOT WAKE UP?: NO
6. HAVE YOU EVER DONE ANYTHING, STARTED TO DO ANYTHING, OR PREPARED TO DO ANYTHING TO END YOUR LIFE?: NO
2. HAVE YOU ACTUALLY HAD ANY THOUGHTS OF KILLING YOURSELF IN THE PAST MONTH?: NO

## 2025-07-18 ASSESSMENT — ACTIVITIES OF DAILY LIVING (ADL)
ADLS_ACUITY_SCORE: 41

## 2025-07-18 NOTE — DISCHARGE INSTRUCTIONS
- CT of her head and cervical spine were unremarkable for acute findings.  No signs of brain bleed.  No signs of fracture in your spine.  -You may use Tylenol for the first 48 hours for any headache or neck pain.  After 48 hours you may use ibuprofen and Tylenol.  -If you do have any issues, please follow-up with Dr. Silver for Workmen's Comp. follow-up.  - Workman comp report of workability completed and given to you.  Please keep a copy for yourself and give a copy to your employer.
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
## 2025-07-18 NOTE — ED TRIAGE NOTES
Pt here by himself form his employment, pt reports that about 1 hour ago a box that weighed around 5 pounds fell off the shelf and hit him in the head, pt reports feeling disoriented at the time, pt now reports that the right side of his neck is painful and still feeling dizzy, pt brought back into ER, c-collar placed on pt     Triage Assessment (Adult)       Row Name 07/18/25 1500          Triage Assessment    Airway WDL WDL        Respiratory WDL    Respiratory WDL WDL        Peripheral/Neurovascular WDL    Peripheral Neurovascular WDL WDL

## 2025-07-19 ENCOUNTER — PATIENT OUTREACH (OUTPATIENT)
Dept: CARE COORDINATION | Facility: CLINIC | Age: 25
End: 2025-07-19

## (undated) RX ORDER — IBUPROFEN 400 MG/1
TABLET, FILM COATED ORAL
Status: DISPENSED
Start: 2022-03-13

## (undated) RX ORDER — ACETAMINOPHEN 325 MG/1
TABLET ORAL
Status: DISPENSED
Start: 2022-08-09

## (undated) RX ORDER — MECLIZINE HCL 12.5 MG 12.5 MG/1
TABLET ORAL
Status: DISPENSED
Start: 2021-09-23

## (undated) RX ORDER — DIPHENHYDRAMINE HYDROCHLORIDE 50 MG/ML
INJECTION INTRAMUSCULAR; INTRAVENOUS
Status: DISPENSED
Start: 2022-02-15

## (undated) RX ORDER — ACETAMINOPHEN 500 MG
TABLET ORAL
Status: DISPENSED
Start: 2021-09-16

## (undated) RX ORDER — KETOROLAC TROMETHAMINE 15 MG/ML
INJECTION, SOLUTION INTRAMUSCULAR; INTRAVENOUS
Status: DISPENSED
Start: 2021-09-16

## (undated) RX ORDER — DEXAMETHASONE SODIUM PHOSPHATE 4 MG/ML
INJECTION, SOLUTION INTRA-ARTICULAR; INTRALESIONAL; INTRAMUSCULAR; INTRAVENOUS; SOFT TISSUE
Status: DISPENSED
Start: 2024-05-30

## (undated) RX ORDER — SODIUM CHLORIDE 9 MG/ML
INJECTION, SOLUTION INTRAVENOUS
Status: DISPENSED
Start: 2022-02-15

## (undated) RX ORDER — IBUPROFEN 200 MG
TABLET ORAL
Status: DISPENSED
Start: 2022-08-09

## (undated) RX ORDER — DEXAMETHASONE SODIUM PHOSPHATE 4 MG/ML
INJECTION, SOLUTION INTRA-ARTICULAR; INTRALESIONAL; INTRAMUSCULAR; INTRAVENOUS; SOFT TISSUE
Status: DISPENSED
Start: 2022-08-09

## (undated) RX ORDER — IBUPROFEN 200 MG
TABLET ORAL
Status: DISPENSED
Start: 2022-03-13

## (undated) RX ORDER — IBUPROFEN 400 MG/1
TABLET, FILM COATED ORAL
Status: DISPENSED
Start: 2022-08-09

## (undated) RX ORDER — MECLIZINE HCL 12.5 MG 12.5 MG/1
TABLET ORAL
Status: DISPENSED
Start: 2022-09-28

## (undated) RX ORDER — ACETAMINOPHEN 325 MG/1
TABLET ORAL
Status: DISPENSED
Start: 2022-03-13

## (undated) RX ORDER — DEXAMETHASONE SODIUM PHOSPHATE 4 MG/ML
INJECTION, SOLUTION INTRA-ARTICULAR; INTRALESIONAL; INTRAMUSCULAR; INTRAVENOUS; SOFT TISSUE
Status: DISPENSED
Start: 2021-10-02

## (undated) RX ORDER — ALBUTEROL SULFATE 90 UG/1
AEROSOL, METERED RESPIRATORY (INHALATION)
Status: DISPENSED
Start: 2021-10-02

## (undated) RX ORDER — OXYCODONE HYDROCHLORIDE 5 MG/1
TABLET ORAL
Status: DISPENSED
Start: 2022-03-13